# Patient Record
Sex: FEMALE | Race: WHITE | NOT HISPANIC OR LATINO | Employment: FULL TIME | URBAN - NONMETROPOLITAN AREA
[De-identification: names, ages, dates, MRNs, and addresses within clinical notes are randomized per-mention and may not be internally consistent; named-entity substitution may affect disease eponyms.]

---

## 2017-03-29 ENCOUNTER — HOSPITAL ENCOUNTER (OUTPATIENT)
Dept: RADIOLOGY | Facility: OTHER | Age: 47
End: 2017-03-29
Attending: NURSE PRACTITIONER

## 2017-03-29 ENCOUNTER — HISTORY (OUTPATIENT)
Dept: FAMILY MEDICINE | Facility: OTHER | Age: 47
End: 2017-03-29

## 2017-03-29 ENCOUNTER — OFFICE VISIT - GICH (OUTPATIENT)
Dept: FAMILY MEDICINE | Facility: OTHER | Age: 47
End: 2017-03-29

## 2017-03-29 DIAGNOSIS — Z12.39 ENCOUNTER FOR OTHER SCREENING FOR MALIGNANT NEOPLASM OF BREAST: ICD-10-CM

## 2017-03-29 DIAGNOSIS — Z80.0 FAMILY HISTORY OF MALIGNANT NEOPLASM OF DIGESTIVE ORGAN: ICD-10-CM

## 2017-04-24 ENCOUNTER — COMMUNICATION - GICH (OUTPATIENT)
Dept: FAMILY MEDICINE | Facility: OTHER | Age: 47
End: 2017-04-24

## 2017-04-24 DIAGNOSIS — I10 ESSENTIAL (PRIMARY) HYPERTENSION: ICD-10-CM

## 2017-04-25 ENCOUNTER — HOSPITAL ENCOUNTER (OUTPATIENT)
Dept: LAB | Facility: OTHER | Age: 47
End: 2017-04-25
Attending: GENERAL ACUTE CARE HOSPITAL | Admitting: GENERAL ACUTE CARE HOSPITAL

## 2018-01-04 NOTE — TELEPHONE ENCOUNTER
Patient Information     Patient Name MRN Sex Shonda Wharton [id number] Female 1970      Telephone Encounter by Rudi Menjivar RN at 2017  1:47 PM     Author:  Rudi Menjivar RN Service:  (none) Author Type:  NURS- Registered Nurse     Filed:  2017  1:48 PM Encounter Date:  2017 Status:  Signed     :  Rudi Menjivar RN (NURS- Registered Nurse)            Call placed to patient. Advised her of Dr. Patel's response to rx request. Patient states she doesn't currently have insurance, but will go to [facility] to get slip to have labs completed as requested by PCP to support continued use of Prinzide. Nothing further needed at this time.    Rudi Menjivar RN ....................  2017   1:48 PM

## 2018-01-04 NOTE — PROGRESS NOTES
Patient Information     Patient Name MRN Sex Shonda Wharton [id number] Female 1970      Progress Notes by Saundra Perez at 3/29/2017  1:58 PM     Author:  Saundra Perez Service:  (none) Author Type:  (none)     Filed:  3/29/2017  1:58 PM Date of Service:  3/29/2017  1:58 PM Status:  Signed     :  Saundra Perez            Falls Risk Criteria:    Age 65 and older or under age 4        Sensory deficits    Poor vision    Use of ambulatory aides    Impaired judgment    Unable to walk independently    Meets High Risk criteria for falls:  no

## 2018-01-04 NOTE — TELEPHONE ENCOUNTER
Patient Information     Patient Name MRN Sex Shonda Wharton [id number] Female 1970      Telephone Encounter by Rudi Menjivar RN at 2017  9:52 AM     Author:  Rudi Menjivar RN Service:  (none) Author Type:  NURS- Registered Nurse     Filed:  2017  9:58 AM Encounter Date:  2017 Status:  Signed     :  Rudi Menjivar RN (NURS- Registered Nurse)            Diuretic Combinations    Office visit in the past 12 months or per provider note.    Last visit with PAULINE BARLOW was on: No past appointments listed with this provider  Next visit with PAULINE BARLOW is on: No future appointment listed with this provider  Next visit with Family Practice is on: No future appointment listed in this department    Lab test requirements:  Creatinine and Potassium annually, if ordering lab, order BMP.  No results found for: CREATININE  No results found for: POTASSIUM    Max refill for 12 months from last office visit or per provider note.    Chart review shows that last office visit with a provider for an annual physical and medication review was with Dr. Myles on 16. Medication requested also requires annual labs as listed above. Writer unable to refill requested rx as per protocol. Will route request to PCP for his consideration/approval, as well as send reminder letter to patient that she is due for annual labs/annual office visit.    Unable to complete prescription refill per RN Medication Refill Policy.................... Rudi Menjivar RN ....................  2017   9:56 AM

## 2018-01-04 NOTE — PROGRESS NOTES
Patient Information     Patient Name MRN Sex Shonda Wharton [id number] Female 1970      Progress Notes by Imani Regalado NP at 3/29/2017 10:00 AM     Author:  Imani Regalado NP Service:  (none) Author Type:  PHYS- Nurse Practitioner     Filed:  3/30/2017  4:25 PM Encounter Date:  3/29/2017 Status:  Signed     :  Imani Regalado NP (PHYS- Nurse Practitioner)            SUBJECTIVE:    Shonda Schmid is a 47 y.o. female who presents for yearly breast exam, screening mammography [facility]. No family history of breast cancer known.  Does breast exams occasionally, no concerns. Last year mammography negative. Reports had colonoscopy through [facility] and [facility]  last year and found tubular adenomatous polyp, recommended follow-up colonoscopy in 3 years. No notes available to review.    HPI    No Known Allergies,   Family History       Problem   Relation Age of Onset     Heart Disease  Mother 48     CABG       Cancer-colon  Father       of colon cancer at age 58       Heart Disease  Maternal Aunt       who have had bypass surgeries and stents       Heart Disease  Maternal Uncle       who have had bypass surgeries and stents       Heart Disease  Maternal Grandmother      CABG       Other  Paternal Grandmother       at age 93 of natural causes.         Cancer-colon  Paternal Grandmother      Cancer-breast  No Family History    ,   Current Outpatient Prescriptions on File Prior to Visit       Medication  Sig Dispense Refill     lisinopril-hydrochlorothiazide, 20-25 mg, (PRINZIDE, ZESTORETIC) 20-25 mg per tablet Take 1 tablet by mouth once daily. 90 tablet 3     No current facility-administered medications on file prior to visit.    ,   Current Outpatient Prescriptions:      lisinopril-hydrochlorothiazide, 20-25 mg, (PRINZIDE, ZESTORETIC) 20-25 mg per tablet, Take 1 tablet by mouth once daily., Disp: 90 tablet, Rfl: 3  Medications have been reviewed by me and are current to the  "best of my knowledge and ability.,   Past Medical History      Diagnosis   Date     History of pregnancy       Multiparity, Normal vaginal delivery x2      No Significant Past Medical History     ,   Patient Active Problem List      Diagnosis Date Noted     PLANTAR FASCIITIS, RECURRENT      HYPERLIPIDEMIA      Thrombophlebitis of superficial veins of left lower extremity    ,   Past Surgical History       Procedure   Laterality Date     Tonsillectomy   Age 5     Colonoscopy screening   03/2016     TRUDI --colonoscopy program  [facility]--recommend 3 yeaqr followup      and   Social History     Substance Use Topics       Smoking status: Never Smoker     Smokeless tobacco: Not on file     Alcohol use No       REVIEW OF SYSTEMS:  Review of Systems   Constitutional: Negative.    HENT: Negative.    Eyes: Negative.    Respiratory: Negative.    Cardiovascular: Negative.    Gastrointestinal: Negative.    Genitourinary: Negative.    Musculoskeletal: Negative.    Skin: Negative.    Neurological: Negative.    Endo/Heme/Allergies: Negative.    Psychiatric/Behavioral: Negative.        OBJECTIVE:  /82  Ht 1.759 m (5' 9.25\")  Wt 91.2 kg (201 lb)  LMP 03/19/2017  Breastfeeding? No  BMI 29.47 kg/m2    EXAM:   Physical Exam   Constitutional: She is oriented to person, place, and time and well-developed, well-nourished, and in no distress.   Cardiovascular: Normal rate.    Pulmonary/Chest: Effort normal.   Bilateral breast exam negative for any palpable masses, tenderness. No dimpling or erythema. No nipple discharge. No palpable lymphadenopathy   Musculoskeletal: Normal range of motion.   Neurological: She is alert and oriented to person, place, and time. Gait normal.   Skin: Skin is warm and dry.   Psychiatric: Mood, memory, affect and judgment normal.   Nursing note and vitals reviewed.      ASSESSMENT/PLAN:    ICD-10-CM    1. Family history of colon cancer Z80.0 XR MAMMO BILAT SCREENING [facility]   2. Breast cancer screening " Z12.39       mammography pending    Plan:  Patient will follow-up for yearly screening    Patient will obtain [place] colonoscopy screening operative report and pathology and forward for medical records    Last Pap 2015 negative--with negative history--due 2018

## 2018-01-04 NOTE — TELEPHONE ENCOUNTER
Patient Information     Patient Name MRN Sex Shonda Wharton [id number] Female 1970      Telephone Encounter by Higinio Patel MD at 2017 12:02 PM     Author:  Higinio Patel MD Service:  (none) Author Type:  Physician     Filed:  2017 12:04 PM Encounter Date:  2017 Status:  Signed     :  Higinio Patel MD (Physician)            Patient had an exam in March. Blood pressure was normal. Needs a basic metabolic panel. Refill done for a year. Order done. Please have her schedule a lab only appointment.  Higinio Patel MD ....................  2017   12:03 PM

## 2018-01-04 NOTE — PROGRESS NOTES
Patient Information     Patient Name MRN Sex Shonda Wharton [id number] Female 1970      Progress Notes by Ellen Niño RN at 2017  5:16 PM     Author:  Ellen Niño RN Service:  (none) Author Type:  NURS- Registered Nurse     Filed:  2017  5:20 PM Date of Service:  2017  5:16 PM Status:  Signed     :  Ellen Niño RN (NURS- Registered Nurse)            This RN was called by Leela Ryder from the [facility] requesting this patient's chart be reviewed to see if she was receiving care from our hospital clinic. I informed Leela Ryder of the last date seen in this patient's chart as a phone conversation not an actual appointment. No other information released.

## 2018-01-18 PROBLEM — E78.5 HYPERLIPIDEMIA: Status: ACTIVE | Noted: 2018-01-18

## 2018-01-18 PROBLEM — I80.02 THROMBOPHLEBITIS OF SUPERFICIAL VEINS OF LEFT LOWER EXTREMITY: Status: ACTIVE | Noted: 2018-01-18

## 2018-01-18 PROBLEM — M72.2 PLANTAR FASCIAL FIBROMATOSIS: Status: ACTIVE | Noted: 2018-01-18

## 2018-01-18 RX ORDER — LISINOPRIL AND HYDROCHLOROTHIAZIDE 20; 25 MG/1; MG/1
TABLET ORAL
COMMUNITY
Start: 2017-04-25 | End: 2018-06-07

## 2018-01-25 VITALS
WEIGHT: 201 LBS | HEIGHT: 69 IN | BODY MASS INDEX: 29.77 KG/M2 | DIASTOLIC BLOOD PRESSURE: 82 MMHG | SYSTOLIC BLOOD PRESSURE: 124 MMHG

## 2018-04-19 ENCOUNTER — OFFICE VISIT (OUTPATIENT)
Dept: FAMILY MEDICINE | Facility: OTHER | Age: 48
End: 2018-04-19
Attending: FAMILY MEDICINE
Payer: COMMERCIAL

## 2018-04-19 VITALS
HEIGHT: 69 IN | HEART RATE: 68 BPM | BODY MASS INDEX: 29.41 KG/M2 | WEIGHT: 198.6 LBS | SYSTOLIC BLOOD PRESSURE: 132 MMHG | DIASTOLIC BLOOD PRESSURE: 70 MMHG

## 2018-04-19 DIAGNOSIS — Z00.00 ROUTINE GENERAL MEDICAL EXAMINATION AT A HEALTH CARE FACILITY: Primary | ICD-10-CM

## 2018-04-19 PROCEDURE — 99396 PREV VISIT EST AGE 40-64: CPT | Performed by: FAMILY MEDICINE

## 2018-04-19 PROCEDURE — G0123 SCREEN CERV/VAG THIN LAYER: HCPCS | Performed by: FAMILY MEDICINE

## 2018-04-19 PROCEDURE — 88142 CYTOPATH C/V THIN LAYER: CPT | Mod: ZL | Performed by: FAMILY MEDICINE

## 2018-04-19 ASSESSMENT — ANXIETY QUESTIONNAIRES
7. FEELING AFRAID AS IF SOMETHING AWFUL MIGHT HAPPEN: NOT AT ALL
GAD7 TOTAL SCORE: 0
3. WORRYING TOO MUCH ABOUT DIFFERENT THINGS: NOT AT ALL
1. FEELING NERVOUS, ANXIOUS, OR ON EDGE: NOT AT ALL
6. BECOMING EASILY ANNOYED OR IRRITABLE: NOT AT ALL
5. BEING SO RESTLESS THAT IT IS HARD TO SIT STILL: NOT AT ALL
2. NOT BEING ABLE TO STOP OR CONTROL WORRYING: NOT AT ALL

## 2018-04-19 ASSESSMENT — PATIENT HEALTH QUESTIONNAIRE - PHQ9: 5. POOR APPETITE OR OVEREATING: NOT AT ALL

## 2018-04-19 NOTE — LETTER
April 26, 2018      Shonda ISSA Binu  [address]  [place] [address]    Dear ,      I am happy to inform you that your recent cervical cancer screening test (PAP smear) was normal.      Preventative screenings such as this help to ensure your health for years to come. You should repeat a pap smear in 3 years, unless otherwise directed.      You will still need to return to the clinic every year for your annual exam and other preventive tests.     Please contact the clinic at [phone] if you have further questions.       Sincerely,      Franklin Penn MD

## 2018-04-19 NOTE — NURSING NOTE
Patient here for TRUDI pap and mammogram. Needs refill on prescription. Daly Salguero LPN .......................4/19/2018  8:03 AM

## 2018-04-19 NOTE — PROGRESS NOTES
"  SUBJECTIVE:   Shonda Schmid is a 48 year old female who presents to clinic today for the following health issues: Tone physical    HPI Comments: Patient arrives here for a Tone physical.  Currently does not offer any complaints or concerns.    Physical                 Patient Active Problem List    Diagnosis Date Noted     Hyperlipidemia 01/18/2018     Priority: Medium     Plantar fascial fibromatosis 01/18/2018     Priority: Medium     Thrombophlebitis of superficial veins of left lower extremity 01/18/2018     Priority: Medium     Past Medical History:   Diagnosis Date     Personal history of other medical treatment (CODE)     Multiparity, Normal vaginal delivery x2     Personal history of other medical treatment (CODE)     No Comments Provided      Past Surgical History:   Procedure Laterality Date     COLONOSCOPY      03/2016,TONE --colonoscopy program  [facility]--recommend 3 yeaqr followup     TONSILLECTOMY      Age 5     Social History     Social History Narrative     Current Outpatient Prescriptions   Medication Sig Dispense Refill     lisinopril-hydrochlorothiazide (PRINZIDE/ZESTORETIC) 20-25 MG per tablet Take 1 tablet by mouth once daily.       No Known Allergies    Review of Systems     OBJECTIVE:     /70 (BP Location: Right arm, Patient Position: Sitting)  Pulse 68  Ht 5' 8.75\" (1.746 m)  Wt 198 lb 9.6 oz (90.1 kg)  BMI 29.54 kg/m2  Body mass index is 29.54 kg/(m^2).  Physical Exam   Constitutional: She appears well-developed.   HENT:   Head: Normocephalic.   Pulmonary/Chest: Effort normal and breath sounds normal.   Breasts without any masses   Genitourinary: Vagina normal and uterus normal.   Genitourinary Comments: Cervix was unremarkable.  Pap smear obtained   Neurological: She is alert.       none     ASSESSMENT/PLAN:           ICD-10-CM    1. Routine general medical examination at a health care facility Z00.00 Pap Screen Thin Prep with HPV - recommended age 30 - 65 years (select " HPV order below)   Satisfactory exam.  Pap smear submitted.  Mammogram later.      Franklin Penn MD  [facility]

## 2018-04-19 NOTE — MR AVS SNAPSHOT
After Visit Summary   4/19/2018    Shonda Schmid    MRN: [id number]           Patient Information     Date Of Birth          1970        Visit Information        Provider Department      4/19/2018 8:15 AM Franklin Penn MD [facility]        Today's Diagnoses     Routine general medical examination at a health care facility    -  1       Follow-ups after your visit        Your next 10 appointments already scheduled     Apr 20, 2018  1:30 PM CDT   (Arrive by 1:15 PM)   MA SCREENING DIGITAL BILATERAL with GHMA1   [facility]    [address]  [place] [id number] [phone]           Do not use any powder, lotion or deodorant under your arms or on your breast. If you do, we will ask you to remove it before your exam.  Wear comfortable, two-piece clothing.  If you have any allergies, tell your care team.  Bring any previous mammograms from other facilities or have them mailed to the breast center.              Who to contact     If you have questions or need follow up information about today's clinic visit or your schedule please contact [facility] directly at [phone].  Normal or non-critical lab and imaging results will be communicated to you by MyChart, letter or phone within 4 business days after the clinic has received the results. If you do not hear from us within 7 days, please contact the clinic through [employer] or phone. If you have a critical or abnormal lab result, we will notify you by phone as soon as possible.  Submit refill requests through [employer] or call your pharmacy and they will forward the refill request to us. Please allow 3 business days for your refill to be completed.          Additional Information About Your Visit        [employer] Information     [employer] lets you send messages to your doctor, view your test results, renew your prescriptions, schedule  "appointments and more. To sign up, go to [email or web address] . Click on \"Log in\" on the left side of the screen, which will take you to the Welcome page. Then click on \"Sign up Now\" on the right side of the page.     You will be asked to enter the access code listed below, as well as some personal information. Please follow the directions to create your username and password.     Your access code is: [email or web address]  Expires: 2018  2:08 PM     Your access code will  in 90 days. If you need help or a new code, please call your [facility] clinic or [phone].        Care EveryWhere ID     This is your Care EveryWhere ID. This could be used by other organizations to access your [facility] medical records  [id number]        Your Vitals Were     Pulse Height BMI (Body Mass Index)             68 5' 8.75\" (1.746 m) 29.54 kg/m2          Blood Pressure from Last 3 Encounters:   18 132/70   17 124/82   16 118/82    Weight from Last 3 Encounters:   18 198 lb 9.6 oz (90.1 kg)   17 201 lb (91.2 kg)   16 196 lb 12.8 oz (89.3 kg)              We Performed the Following     Pap Screen Thin Prep with HPV - recommended age 30 - 65 years (select HPV order below)        Primary Care Provider Office Phone # Fax #    Higinio Patel [phone] [address] [facility] [address]        Equal Access to Services     [facility]: Hadii aad ku hadashbrooke Soalfie, waaxda luqadaha, qaybta kaalmatisha waite. So [facility] [phone].    ATENCIÓN: Si habla español, tiene a esparza disposición servicios gratuitos de asistencia lingüística. Llame al [phone].    We comply with applicable federal civil rights laws and [place] laws. We do not discriminate on the basis of race, color, national origin, age, disability, sex, sexual orientation, or gender identity.            Thank you!     Thank you for choosing  [facility]  for your care. Our goal is always to provide you with excellent care. Hearing back from our patients is one way we can continue to improve our services. Please take a few minutes to complete the written survey that you may receive in the mail after your visit with us. Thank you!             Your Updated Medication List - Protect others around you: Learn how to safely use, store and throw away your medicines at www.disposemymeds.org.          This list is accurate as of 4/19/18  2:08 PM.  Always use your most recent med list.                   Brand Name Dispense Instructions for use Diagnosis    lisinopril-hydrochlorothiazide 20-25 MG per tablet    PRINZIDE/ZESTORETIC     Take 1 tablet by mouth once daily.

## 2018-04-20 ENCOUNTER — HOSPITAL ENCOUNTER (OUTPATIENT)
Dept: MAMMOGRAPHY | Facility: OTHER | Age: 48
Discharge: HOME | End: 2018-04-20
Attending: FAMILY MEDICINE | Admitting: FAMILY MEDICINE
Payer: COMMERCIAL

## 2018-04-20 DIAGNOSIS — Z12.31 VISIT FOR SCREENING MAMMOGRAM: ICD-10-CM

## 2018-04-20 PROCEDURE — 77067 SCR MAMMO BI INCL CAD: CPT

## 2018-04-20 ASSESSMENT — ANXIETY QUESTIONNAIRES: GAD7 TOTAL SCORE: 0

## 2018-04-20 ASSESSMENT — PATIENT HEALTH QUESTIONNAIRE - PHQ9: SUM OF ALL RESPONSES TO PHQ QUESTIONS 1-9: 0

## 2018-06-07 DIAGNOSIS — I10 BENIGN ESSENTIAL HYPERTENSION: Primary | ICD-10-CM

## 2018-06-07 NOTE — LETTER
June 11, 2018      Shonda Schmid  [address]  [place] [address]        Dear Shonda,     This letter is to remind you that you are due for follow up labs.    A LIMITED refill of lisinopril/hctz has been called into your pharmacy. Additional refills require you to complete this appointment.    Please call the clinic at [phone] to schedule your appointment.    If you should require additional refills before your scheduled appointment, please contact your pharmacy and we will refill your medication until the date of your appointment.      Thank you for choosing [facility] for your health care needs.    Sincerely,    Refill RN  [facility]

## 2018-06-11 RX ORDER — LISINOPRIL AND HYDROCHLOROTHIAZIDE 20; 25 MG/1; MG/1
TABLET ORAL
Qty: 90 TABLET | Refills: 0 | Status: SHIPPED | OUTPATIENT
Start: 2018-06-11 | End: 2019-08-05

## 2018-06-11 NOTE — TELEPHONE ENCOUNTER
Medication is being filled for 1 time refill only due to: sodium, creatinine, potassium.    Patient is due for medication management appointment. Limited refill provided at this time. Krush message and/or letter sent for reminder to patient. Prescription refilled per RN Medication Refill Policy.................... Clara Garcia ....................  6/11/2018   10:19 AM

## 2018-07-23 NOTE — PROGRESS NOTES
Patient Information     Patient Name  Shonda Schmid MRN  [id number] Sex  Female   1970      Letter by Higinio Patel MD at      Author:  Higinio Patel MD Service:  (none) Author Type:  (none)    Filed:   Encounter Date:  2017 Status:  (Other)           Shonda Schmid  [address]  [facility] [address]          2017    Dear Ms. Schmid:    This is to remind you that you are due for your 1 year physical appointment with Higinio Patel MD as well as annual labs.  Your last visit for an annual physical was on 16. Additional refills of your medication require you to complete this visit.    Please call [phone] to schedule your appointment.    Thank you for choosing [facility] for your health care needs.    Sincerely,      Refill RN  [facility]

## 2018-10-09 DIAGNOSIS — I10 BENIGN ESSENTIAL HYPERTENSION: ICD-10-CM

## 2018-10-09 LAB
ALBUMIN SERPL-MCNC: 4.2 G/DL (ref 3.5–5.7)
ALP SERPL-CCNC: 67 U/L (ref 34–104)
ALT SERPL W P-5'-P-CCNC: 12 U/L (ref 7–52)
ANION GAP SERPL CALCULATED.3IONS-SCNC: 8 MMOL/L (ref 3–14)
AST SERPL W P-5'-P-CCNC: 14 U/L (ref 13–39)
BILIRUB SERPL-MCNC: 0.6 MG/DL (ref 0.3–1)
BUN SERPL-MCNC: 18 MG/DL (ref 7–25)
CALCIUM SERPL-MCNC: 9.4 MG/DL (ref 8.6–10.3)
CHLORIDE SERPL-SCNC: 102 MMOL/L (ref 98–107)
CHOLEST SERPL-MCNC: 207 MG/DL
CO2 SERPL-SCNC: 26 MMOL/L (ref 21–31)
CREAT SERPL-MCNC: 0.81 MG/DL (ref 0.6–1.2)
GFR SERPL CREATININE-BSD FRML MDRD: 75 ML/MIN/1.7M2
GLUCOSE SERPL-MCNC: 125 MG/DL (ref 70–105)
HBA1C MFR BLD: 5.9 % (ref 4–6)
HDLC SERPL-MCNC: 31 MG/DL (ref 23–92)
LDLC SERPL CALC-MCNC: 142 MG/DL
NONHDLC SERPL-MCNC: 176 MG/DL
POTASSIUM SERPL-SCNC: 4.2 MMOL/L (ref 3.5–5.1)
PROT SERPL-MCNC: 7.1 G/DL (ref 6.4–8.9)
SODIUM SERPL-SCNC: 136 MMOL/L (ref 134–144)
TRIGL SERPL-MCNC: 171 MG/DL

## 2018-10-09 PROCEDURE — 80053 COMPREHEN METABOLIC PANEL: CPT | Performed by: FAMILY MEDICINE

## 2018-10-09 PROCEDURE — 83036 HEMOGLOBIN GLYCOSYLATED A1C: CPT | Performed by: FAMILY MEDICINE

## 2018-10-09 PROCEDURE — 36415 COLL VENOUS BLD VENIPUNCTURE: CPT | Performed by: FAMILY MEDICINE

## 2018-10-09 PROCEDURE — 80061 LIPID PANEL: CPT | Performed by: FAMILY MEDICINE

## 2019-08-05 ENCOUNTER — OFFICE VISIT (OUTPATIENT)
Dept: FAMILY MEDICINE | Facility: OTHER | Age: 49
End: 2019-08-05
Attending: PHYSICIAN ASSISTANT

## 2019-08-05 ENCOUNTER — HOSPITAL ENCOUNTER (OUTPATIENT)
Dept: MAMMOGRAPHY | Facility: OTHER | Age: 49
Discharge: HOME | End: 2019-08-05
Attending: PHYSICIAN ASSISTANT | Admitting: PHYSICIAN ASSISTANT

## 2019-08-05 VITALS
SYSTOLIC BLOOD PRESSURE: 132 MMHG | RESPIRATION RATE: 18 BRPM | HEIGHT: 68 IN | BODY MASS INDEX: 30.62 KG/M2 | WEIGHT: 202 LBS | DIASTOLIC BLOOD PRESSURE: 82 MMHG | TEMPERATURE: 96.9 F | HEART RATE: 72 BPM

## 2019-08-05 DIAGNOSIS — R73.03 PREDIABETES: ICD-10-CM

## 2019-08-05 DIAGNOSIS — Z12.31 VISIT FOR SCREENING MAMMOGRAM: ICD-10-CM

## 2019-08-05 DIAGNOSIS — I10 BENIGN ESSENTIAL HYPERTENSION: ICD-10-CM

## 2019-08-05 DIAGNOSIS — Z00.00 ROUTINE HISTORY AND PHYSICAL EXAMINATION OF ADULT: Primary | ICD-10-CM

## 2019-08-05 LAB
ANION GAP SERPL CALCULATED.3IONS-SCNC: 7 MMOL/L (ref 3–14)
BUN SERPL-MCNC: 15 MG/DL (ref 7–25)
CALCIUM SERPL-MCNC: 9.4 MG/DL (ref 8.6–10.3)
CHLORIDE SERPL-SCNC: 99 MMOL/L (ref 98–107)
CO2 SERPL-SCNC: 30 MMOL/L (ref 21–31)
CREAT SERPL-MCNC: 0.75 MG/DL (ref 0.6–1.2)
GFR SERPL CREATININE-BSD FRML MDRD: 82 ML/MIN/{1.73_M2}
GLUCOSE SERPL-MCNC: 100 MG/DL (ref 70–105)
HBA1C MFR BLD: 6.3 % (ref 4–6)
POTASSIUM SERPL-SCNC: 3.9 MMOL/L (ref 3.5–5.1)
SODIUM SERPL-SCNC: 136 MMOL/L (ref 134–144)

## 2019-08-05 PROCEDURE — 36415 COLL VENOUS BLD VENIPUNCTURE: CPT | Mod: ZL | Performed by: PHYSICIAN ASSISTANT

## 2019-08-05 PROCEDURE — 77063 BREAST TOMOSYNTHESIS BI: CPT

## 2019-08-05 PROCEDURE — 99396 PREV VISIT EST AGE 40-64: CPT | Performed by: PHYSICIAN ASSISTANT

## 2019-08-05 PROCEDURE — 80048 BASIC METABOLIC PNL TOTAL CA: CPT | Mod: ZL | Performed by: PHYSICIAN ASSISTANT

## 2019-08-05 PROCEDURE — 83036 HEMOGLOBIN GLYCOSYLATED A1C: CPT | Mod: ZL | Performed by: PHYSICIAN ASSISTANT

## 2019-08-05 RX ORDER — LISINOPRIL AND HYDROCHLOROTHIAZIDE 20; 25 MG/1; MG/1
1 TABLET ORAL DAILY
Qty: 90 TABLET | Refills: 3 | Status: SHIPPED | OUTPATIENT
Start: 2019-08-05 | End: 2020-08-03

## 2019-08-05 ASSESSMENT — MIFFLIN-ST. JEOR: SCORE: 1584.77

## 2019-08-05 NOTE — LETTER
August 6, 2019      Shonda Schmid  [address] [place] [address]        Dear ,    We are writing to inform you of your test results.    Your electrolytes and kidney function are in the normal range.    Your hemoglobin A1c which is a diabetes check increased from 5.9 to 6.3.  This puts you on the higher end of the prediabetic range.  I would recommend working on good diet, exercise, weight loss and decreasing sugar in your diet in order to reduce future risk of diabetes concerns.  I also placed a referral for a prediabetic educator to discuss ways to reduce future risk of diabetes.    Consider the following general recommendations to keep your blood sugar in a good range:    1. Lose weight - Losing 5 to 10 percent of your body weight can lower your risk a lot. If you weigh 200 pounds, that means you should lose 10 to 20 pounds. If you weigh 150 pounds, that means you should lose 7 to 15 pounds.     2. Eat right - Choose a diet rich in fruits, vegetables, and low-fat dairy products, but low in meats, sweets, and refined grains. Stay away from sweet drinks, like soda and juice.     3. Be active for 30 minutes a day - You don t have to go to the gym or break a sweat to get a benefit. Walking, gardening, and dancing are all activities that can help.     4. Quit smoking - If you smoke, ask your doctor or nurse for advice on how to quit. People are much more likely to succeed if they have help and get medicines to help them quit.         Resulted Orders   Hemoglobin A1c   Result Value Ref Range    Hemoglobin A1C 6.3 (H) 4.0 - 6.0 %   Basic Metabolic Panel   Result Value Ref Range    Sodium 136 134 - 144 mmol/L    Potassium 3.9 3.5 - 5.1 mmol/L    Chloride 99 98 - 107 mmol/L    Carbon Dioxide 30 21 - 31 mmol/L    Anion Gap 7 3 - 14 mmol/L    Glucose 100 70 - 105 mg/dL    Urea Nitrogen 15 7 - 25 mg/dL    Creatinine 0.75 0.60 - 1.20 mg/dL    GFR Estimate 82 >60 mL/min/[1.73_m2]    GFR Estimate  If Black >90 >60 mL/min/[1.73_m2]    Calcium 9.4 8.6 - 10.3 mg/dL       If you have any questions or concerns, please call the clinic at the number listed above.       Sincerely,        Diana Hernandez PA-C

## 2019-08-05 NOTE — PATIENT INSTRUCTIONS
"Please call to schedule a recheck colonoscopy.     Healthy Strategies  1. Eat at least 3 meals a day and never skip breakfast.  2. Eat more slowly.  3. Decrease portion size.  4. Provide structure by using meal replacement bars or shakes, and/or low calorie frozen meals.  5. For good nutrition incorporate fruit, vegetables, whole grains, lean protein, and low-fat dairy.  6. Remove trigger foods from yourenvironment to avoid impulse eating.  7. Increase physical activity: get a pedometer and aim for 10,000 steps a day or 30-35 minutes of activity 5 days per week.  8. Weigh yourself daily or at least weekly.  9. Keep a record of what you eat and your activity.  10. Establish a support system such as afriend, group or program.    11. Read Edenilson Corral's \"Eat to Live\". Remember it is important to have a minimum of 1200 calories a day, okay to use olive oil, 40 grams of fiber daily. No more than two servings (the size of your palm) of red meat a week.     Please consider the following general health recommendations:    Schedule a mammogram annually starting at the age of 40 years old unless recommended earlier by your primary care provider. Come for a general exam once yearly.    Eat a quality diet (generally, low in simple sugars, starches, cholesterol and saturated fat.)    Please get 1500 mg of calcium in divided doses with 1500 units vitamin D in your diet daily. Take supplements as needed to obtain full recommended amounts.     Stay physically active. Regular walking or other exercise is one of the best ways to minimize pain of arthritis; maintain independence and mobility; maintain bone strength; maintain conditioning of your heart. Find something you enjoy and a friend to do it with you.    Maintain ideal weight. Your Body mass index is Body mass index is 30.71 kg/m .. Generally a BMI of 20-25 is considered ideal. Overweight is defined as 25-30, obese is 30-35 and markedly obese is greater than 35.    Apply sun " block (SPF 25 or greater) on exposed skin anytime you are out in the sun to prevent skin cancer.     Wear a seatbelt whenever you are in a car.    Colonoscopy (an exam of the colon) is recommended every 10 years after the age of 50 to screen for colon cancer. (More often if you are at increased risk.)    Obtain a flu shot every fall.    You should have a tetanus booster at least once every 10 years.    A vaccine to reduce your chances of getting shingles is available if you are over 50. Information about the vaccine is available through the clinic or at:  (https://www.cdc.gov/vaccines/vpd/shingles/public/shingrix/index.html)    Check blood sugar annually. Cholesterol annually unless you have had a normal level when last checked within 5 years.     I recommend that you have a general physical exam every year. You should have a pap test every 3 years between the ages of 21 and 30 and every 3-5 years between the ages of 30 and 65 depending on your test unless you have had previous abnormal pap smears, (in these cases the exams and PAP's should be done on a schedule as recommended by your primary care provider). If you have had hysterectomy in the past, your future Pap plan may be different.

## 2019-08-05 NOTE — NURSING NOTE
Chief Complaint   Patient presents with     Physical     TRUDI         Medication Reconciliation: complete    Wilda Barrera, LPN

## 2019-08-05 NOTE — PROGRESS NOTES
Nursing Notes:   Wilda Barrera LPN  2019  9:17 AM  Signed  Chief Complaint   Patient presents with     Physical     TRUDI         Medication Reconciliation: complete    Wilda Barrera LPN      ANNUAL PHYSICAL - FEMALE    HPI: Shonda Schmid who presents for a yearly exam.  Concerns include: TRUDI physical.     Patient has history of hypertension.  Currently taking lisinopril/hydrochlorothiazide.  No problems or side effects of the medication.  No chest pain, palpitations, problems breathing, arm pain, jaw pain.  No recent cough or cold symptoms.  No GI or urinary symptoms.  Tolerating the medication well.  Blood pressure has been stable.    Patient's last menstrual period was 2019 (approximate).   Contraception: no  Risk for STI?: no  Last pap: UTD  Any hx of abnormal paps:  no  FH of early CA?: no  Cholesterol/DM concerns/screening: Prediabetes in the past, needs rechecked.  Up-to-date on cholesterol screening  Tobacco?: no  Calcium intake: yes  DEXA: na  Last mammo: need one  Colonoscopy: need one  Immunizations: Offered Tdap and Shingrix.  Declined today.    Patient Active Problem List    Diagnosis Date Noted     Hyperlipidemia 2018     Priority: Medium     Plantar fascial fibromatosis 2018     Priority: Medium     Thrombophlebitis of superficial veins of left lower extremity 2018     Priority: Medium       Past Medical History:   Diagnosis Date     Personal history of other medical treatment (CODE)     Multiparity, Normal vaginal delivery x2     Personal history of other medical treatment (CODE)     No Comments Provided       Past Surgical History:   Procedure Laterality Date     COLONOSCOPY      2016,TRUDI --colonoscopy program  [facility]--recommend 3 yeaqr followup     TONSILLECTOMY      Age 5       Family History   Problem Relation Age of Onset     Heart Disease Mother 48        Heart Disease,CABG     Colon Cancer Father         Cancer-colon, of  "colon cancer at age 58     Heart Disease Maternal Aunt         Heart Disease, who have had bypass surgeries and stents     Heart Disease Maternal Uncle         Heart Disease, who have had bypass surgeries and stents     Heart Disease Maternal Grandmother         Heart Disease,CABG     Other - See Comments Paternal Grandmother          at age 93 of natural causes.     Colon Cancer Paternal Grandmother         Cancer-colon     Breast Cancer No family hx of         Cancer-breast       Social History     Tobacco Use     Smoking status: Never Smoker     Smokeless tobacco: Never Used   Substance Use Topics     Alcohol use: No       Current Outpatient Medications   Medication Sig Dispense Refill     lisinopril-hydrochlorothiazide (PRINZIDE/ZESTORETIC) 20-25 MG tablet Take 1 tablet by mouth daily 90 tablet 3       No Known Allergies    REVIEW OF SYSTEMS:  Refer to HPI.    PHYSICAL EXAM:  /82 (BP Location: Right arm, Patient Position: Sitting, Cuff Size: Adult Regular)   Pulse 72   Temp 96.9  F (36.1  C)   Resp 18   Ht 1.727 m (5' 8\")   Wt 91.6 kg (202 lb)   LMP 2019 (Approximate)   BMI 30.71 kg/m    CONSTITUTIONAL:  Alert,cooperative, NAD.  EYES: No scleral icterus.  PERRLA.  Conjunctiva clear.  ENT/MOUTH: External ears and nose normal.  TMs normal.  Moist mucous membranes. Oropharynx clear.    ENDO: No thyromegaly or thyroidnodules.  LYMPH:  No cervical or supraclavicular LA.    BREASTS: No skin abnormalities, no erythema.  No discrete masses.  No nipple discharge, no axillary, supra- or infraclavicular LA.   CARDIOVASCULAR: Regular,S1, S2.  No S3 or S4.  No murmur/gallop/rub.  No peripheral edema.  RESPIRATORY: CTA bilaterally, no wheezes, rhonchi or rales.  GI: Bowel sounds wnl.  Soft, nontender, nondistended.  No masses or HSM.  No rebound or guarding.  : declined exam.  Pap smear obtained: no  MSKEL: Grossly normal ROM.  No clubbing.  INTEGUMENTARY:  Warm, dry.  No rash noted on exposed " "skin.  NEUROLOGIC: Facies symmetric.  Grossly normal movement and tone.  No tremor.  PSYCHIATRIC: Affect normal.  Speech fluent.      PHQ Depression Screen  PHQ-9 SCORE 4/19/2018   PHQ-9 Total Score 0     ASSESSMENT AND PLAN:      ICD-10-CM    1. Routine history and physical examination of adult Z00.00    2. Benign essential hypertension I10 lisinopril-hydrochlorothiazide (PRINZIDE/ZESTORETIC) 20-25 MG tablet     Basic Metabolic Panel     Basic Metabolic Panel   3. Prediabetes R73.03 Hemoglobin A1c     Hemoglobin A1c       Encouraged the patient to call the TRUDI program in order to schedule her colonoscopy.    Hypertension:  Refilled lisinopril/hydrochlorthiazide.  No changes are warranted at this time.  Encouraged continued good diet and exercise.  Completed BMP for monitoring.    Prediabetes: Complete hemoglobin A1c and glucose for monitoring.  Results are pending.    Return in 1 year for repeat physical.    Patient Instructions   Please call to schedule a recheck colonoscopy.     Healthy Strategies  1. Eat at least 3 meals a day and never skip breakfast.  2. Eat more slowly.  3. Decrease portion size.  4. Provide structure by using meal replacement bars or shakes, and/or low calorie frozen meals.  5. For good nutrition incorporate fruit, vegetables, whole grains, lean protein, and low-fat dairy.  6. Remove trigger foods from yourenvironment to avoid impulse eating.  7. Increase physical activity: get a pedometer and aim for 10,000 steps a day or 30-35 minutes of activity 5 days per week.  8. Weigh yourself daily or at least weekly.  9. Keep a record of what you eat and your activity.  10. Establish a support system such as afriend, group or program.    11. Read Edenilson Corral's \"Eat to Live\". Remember it is important to have a minimum of 1200 calories a day, okay to use olive oil, 40 grams of fiber daily. No more than two servings (the size of your palm) of red meat a week.     Please consider the following general " health recommendations:    Schedule a mammogram annually starting at the age of 40 years old unless recommended earlier by your primary care provider. Come for a general exam once yearly.    Eat a quality diet (generally, low in simple sugars, starches, cholesterol and saturated fat.)    Please get 1500 mg of calcium in divided doses with 1500 units vitamin D in your diet daily. Take supplements as needed to obtain full recommended amounts.     Stay physically active. Regular walking or other exercise is one of the best ways to minimize pain of arthritis; maintain independence and mobility; maintain bone strength; maintain conditioning of your heart. Find something you enjoy and a friend to do it with you.    Maintain ideal weight. Your Body mass index is Body mass index is 30.71 kg/m .. Generally a BMI of 20-25 is considered ideal. Overweight is defined as 25-30, obese is 30-35 and markedly obese is greater than 35.    Apply sun block (SPF 25 or greater) on exposed skin anytime you are out in the sun to prevent skin cancer.     Wear a seatbelt whenever you are in a car.    Colonoscopy (an exam of the colon) is recommended every 10 years after the age of 50 to screen for colon cancer. (More often if you are at increased risk.)    Obtain a flu shot every fall.    You should have a tetanus booster at least once every 10 years.    A vaccine to reduce your chances of getting shingles is available if you are over 50. Information about the vaccine is available through the clinic or at:  (https://www.cdc.gov/vaccines/vpd/shingles/public/shingrix/index.html)    Check blood sugar annually. Cholesterol annually unless you have had a normal level when last checked within 5 years.     I recommend that you have a general physical exam every year. You should have a pap test every 3 years between the ages of 21 and 30 and every 3-5 years between the ages of 30 and 65 depending on your test unless you have had previous abnormal pap  smears, (in these cases the exams and PAP's should be done on a schedule as recommended by your primary care provider). If you have had hysterectomy in the past, your future Pap plan may be different.           Relevant cancer screening discussed.    Counseled on healthy diet, Calcium and vitamin D intake, and exercise.    Diana Hernandez PA-C..................8/5/2019 9:15 AM

## 2019-08-06 ENCOUNTER — TELEPHONE (OUTPATIENT)
Dept: FAMILY MEDICINE | Facility: OTHER | Age: 49
End: 2019-08-06

## 2019-08-06 DIAGNOSIS — R73.03 PREDIABETES: Primary | ICD-10-CM

## 2019-08-06 NOTE — TELEPHONE ENCOUNTER
Patient notified, is not interested in meeting with prediabetic educator at this time.  Mickie Barrera LPN ...... 8/6/2019 9:45 AM

## 2019-08-06 NOTE — TELEPHONE ENCOUNTER
Your electrolytes and kidney function are in the normal range.  Your hemoglobin A1c which is a diabetes check increased from 5.9-6.3.  This puts you on the higher end of the prediabetic range.  I would recommend working on good diet, exercise, weight loss and decreasing sugar in your diet in order to reduce future risk of diabetes concerns.  I also placed a referral for a prediabetic educator to discuss ways to reduce future risk of diabetes.  Diana Hernandez PA-C.......... 8/6/2019 9:00 AM

## 2019-08-07 ENCOUNTER — ALLIED HEALTH/NURSE VISIT (OUTPATIENT)
Dept: FAMILY MEDICINE | Facility: OTHER | Age: 49
End: 2019-08-07

## 2019-08-07 DIAGNOSIS — Z11.1 SCREENING EXAMINATION FOR PULMONARY TUBERCULOSIS: Primary | ICD-10-CM

## 2019-08-07 PROCEDURE — 86580 TB INTRADERMAL TEST: CPT

## 2019-08-07 NOTE — PROGRESS NOTES
The patient is asked the following questions today and these are her answers:  Verified name and date of birth   -Have you had a mantoux administered in the past 30 days?    No  -Have you had a previous positive Mantoux.  No  -Have you received BCG in the past.  No  -Have you had a live vaccine  (MMR, Varicella, OPV, Yellow Fever) in the last 6 weeks.  No  -Have you had and active  viral or bacterial infection in the past 6 weeks.  No  -Have you received corticosteroids or immunosuppressive agents in the past 6 weeks.  No  -Have you been diagnosed with HIV?  No  -Do you have a malignancy?  No  Pt will return here to have read in 48 to 72 hours   Lashonda Quinn RN on 8/7/2019 at 2:19 PM

## 2019-08-09 ENCOUNTER — ALLIED HEALTH/NURSE VISIT (OUTPATIENT)
Dept: FAMILY MEDICINE | Facility: OTHER | Age: 49
End: 2019-08-09

## 2019-08-09 DIAGNOSIS — Z30.42 ENCOUNTER FOR MANAGEMENT AND INJECTION OF DEPO-PROVERA: Primary | ICD-10-CM

## 2019-08-09 LAB
PPDINDURATION: 0 MM (ref 0–5)
PPDREDNESS: 0 MM

## 2019-08-09 NOTE — PROGRESS NOTES
Pt was here to have Mantoux read and copy of paper form of Mantoux read and negative . Verified name and date of birth . Lashonda Quinn RN on 8/9/2019 at 2:50 PM

## 2020-07-31 DIAGNOSIS — I10 BENIGN ESSENTIAL HYPERTENSION: ICD-10-CM

## 2020-07-31 NOTE — LETTER
August 3, 2020      Shonda Schmid  [address]  [place] [address]        Dear Shonda,     A refill request was received from your pharmacy for Lisinopril-hydrochlorothiazide.    Additional refills require an office visit with Diana Hernandez for annual review and labs.    Please call [phone] to schedule appointment.          Sincerely,      Refill Nurse

## 2020-08-03 RX ORDER — LISINOPRIL AND HYDROCHLOROTHIAZIDE 20; 25 MG/1; MG/1
TABLET ORAL
Qty: 90 TABLET | Refills: 0 | Status: SHIPPED | OUTPATIENT
Start: 2020-08-03 | End: 2020-10-09

## 2020-08-03 NOTE — TELEPHONE ENCOUNTER
Prescription approved per [facility] Refill Protocol.  LVO and labs: 8/5/2019  Patient is due for annual review and labs  Letter sent  Yenny refills given    Steph Kerr RN on 8/3/2020 at 3:58 PM     Rhomboid Transposition Flap Text: The defect edges were debeveled with a #15 scalpel blade.  Given the location of the defect and the proximity to free margins a rhomboid transposition flap was deemed most appropriate.  Using a sterile surgical marker, an appropriate rhomboid flap was drawn incorporating the defect.    The area thus outlined was incised deep to adipose tissue with a #15 scalpel blade.  The skin margins were undermined to an appropriate distance in all directions utilizing iris scissors.

## 2020-10-09 ENCOUNTER — OFFICE VISIT (OUTPATIENT)
Dept: FAMILY MEDICINE | Facility: OTHER | Age: 50
End: 2020-10-09
Attending: PHYSICIAN ASSISTANT
Payer: COMMERCIAL

## 2020-10-09 VITALS
SYSTOLIC BLOOD PRESSURE: 118 MMHG | HEART RATE: 85 BPM | WEIGHT: 206.2 LBS | TEMPERATURE: 97 F | DIASTOLIC BLOOD PRESSURE: 78 MMHG | BODY MASS INDEX: 31.35 KG/M2 | RESPIRATION RATE: 16 BRPM | OXYGEN SATURATION: 99 %

## 2020-10-09 DIAGNOSIS — Z00.00 ROUTINE HISTORY AND PHYSICAL EXAMINATION OF ADULT: Primary | ICD-10-CM

## 2020-10-09 DIAGNOSIS — I10 BENIGN ESSENTIAL HYPERTENSION: ICD-10-CM

## 2020-10-09 PROCEDURE — 99396 PREV VISIT EST AGE 40-64: CPT | Performed by: PHYSICIAN ASSISTANT

## 2020-10-09 RX ORDER — LISINOPRIL AND HYDROCHLOROTHIAZIDE 20; 25 MG/1; MG/1
1 TABLET ORAL DAILY
Qty: 90 TABLET | Refills: 0 | Status: SHIPPED | OUTPATIENT
Start: 2020-10-09 | End: 2021-01-27

## 2020-10-09 ASSESSMENT — PAIN SCALES - GENERAL: PAINLEVEL: NO PAIN (0)

## 2020-10-09 NOTE — PROGRESS NOTES
Nursing Notes:   Solange Gray LPN  10/9/2020 11:06 AM  Signed  Chief Complaint   Patient presents with     Breast Pain     Patient here for breast exam. Patient has no complaints.  Medication Reconciliation complete.   Solange Gray LPN  ..................10/9/2020   10:55 AM       ANNUAL PHYSICAL - FEMALE    HPI: Shonda Schmid who presents for a yearly exam.  Concerns include: None.  Patient needs a refill of her blood pressure medication.  Needs to be seen in the [facility] in order to be able to get her blood work completed.  Blood pressure has been stable.    Patient has paperwork for the TRUDI physical.  Needs to have a clinical breast exam.  No acute breast concerns.    Patient's last menstrual period was 06/09/2020.   Contraception: no  Risk for STI?: no  Last pap: 4/19/2018 - nl pap and HPV, repeat in 5 years  Any hx of abnormal paps:  no  FH of early CA?: no  Cholesterol/DM concerns/screening: Prediabetes in the past, needs rechecked.  Needs cholesterol screening  Tobacco?: no  Calcium intake: yes  DEXA: na  Last mammo: 8/5/2019, needs to be repeated  Colonoscopy: need one  Immunizations: Offered Tdap and Shingrix.  Declined today.    Patient Active Problem List    Diagnosis Date Noted     Prediabetes 08/05/2019     Priority: Medium     Hyperlipidemia 01/18/2018     Priority: Medium     Plantar fascial fibromatosis 01/18/2018     Priority: Medium     Thrombophlebitis of superficial veins of left lower extremity 01/18/2018     Priority: Medium       Past Medical History:   Diagnosis Date     Personal history of other medical treatment (CODE)     Multiparity, Normal vaginal delivery x2     Personal history of other medical treatment (CODE)     No Comments Provided       Past Surgical History:   Procedure Laterality Date     COLONOSCOPY      03/2016,TRUDI --colonoscopy program  [facility]--recommend 3 yeaqr followup     TONSILLECTOMY      Age 5       Family History   Problem Relation Age  of Onset     Heart Disease Mother 48        Heart Disease,CABG     Colon Cancer Father         Cancer-colon, of colon cancer at age 58     Heart Disease Maternal Aunt         Heart Disease, who have had bypass surgeries and stents     Heart Disease Maternal Uncle         Heart Disease, who have had bypass surgeries and stents     Heart Disease Maternal Grandmother         Heart Disease,CABG     Other - See Comments Paternal Grandmother          at age 93 of natural causes.     Colon Cancer Paternal Grandmother         Cancer-colon     Breast Cancer No family hx of         Cancer-breast       Social History     Tobacco Use     Smoking status: Never Smoker     Smokeless tobacco: Never Used   Substance Use Topics     Alcohol use: No       Current Outpatient Medications   Medication Sig Dispense Refill     lisinopril-hydrochlorothiazide (ZESTORETIC) 20-25 MG tablet Take 1 tablet by mouth daily 90 tablet 0       No Known Allergies    REVIEW OF SYSTEMS:  Refer to HPI.    PHYSICAL EXAM:  /78 (BP Location: Right arm, Patient Position: Sitting, Cuff Size: Adult Regular)   Pulse 85   Temp 97  F (36.1  C) (Tympanic)   Resp 16   Wt 93.5 kg (206 lb 3.2 oz)   LMP 2020   SpO2 99%   Breastfeeding No   BMI 31.35 kg/m    CONSTITUTIONAL:  Alert,cooperative, NAD.  EYES: No scleral icterus.  PERRLA.  Conjunctiva clear.  ENT/MOUTH: External ears and nose normal.  TMs normal.  Moist mucous membranes. Oropharynx clear.    ENDO: No thyromegaly or thyroidnodules.  LYMPH:  No cervical or supraclavicular LA.    BREASTS: No skin abnormalities, no erythema.  No discrete masses.  No nipple discharge, no axillary, supra- or infraclavicular LA.   CARDIOVASCULAR: Regular,S1, S2.  No S3 or S4.  No murmur/gallop/rub.  No peripheral edema.  RESPIRATORY: CTA bilaterally, no wheezes, rhonchi or rales.  GI: Bowel sounds wnl.  Soft, nontender, nondistended.  No masses or HSM.  No rebound or guarding.  : declined exam.  Pap  "smear obtained: no  MSKEL: Grossly normal ROM.  No clubbing.  INTEGUMENTARY:  Warm, dry.  No rash noted on exposed skin.  NEUROLOGIC: Facies symmetric.  Grossly normal movement and tone.  No tremor.  PSYCHIATRIC: Affect normal.  Speech fluent.      PHQ Depression Screen  PHQ-9 SCORE 4/19/2018   PHQ-9 Total Score 0       Labs:  No results found for any visits on 10/09/20.    ASSESSMENT AND PLAN:      ICD-10-CM    1. Routine history and physical examination of adult  Z00.00    2. Benign essential hypertension  I10 lisinopril-hydrochlorothiazide (ZESTORETIC) 20-25 MG tablet         Completed TRUDI paperwork.  Patient has appointment for a mammogram for breast cancer screening.    Hypertension: Refill blood pressure medication for 3 months.  Declined blood work today.  Will be seen at the [facility] for recheck along with blood work.    Offered Tdap and Shingrix however patient declined at this time.    Relevant cancer screening discussed.    Counseled on healthy diet, Calcium and vitamin D intake, and exercise.    Patient Instructions   Healthy Strategies  1. Eat at least 3 meals a day and never skip breakfast.  2. Eat more slowly.  3. Decrease portion size.  4. Provide structure by using meal replacement bars or shakes, and/or low calorie frozen meals.  5. For good nutrition incorporate fruit, vegetables, whole grains, lean protein, and low-fat dairy.  6. Remove trigger foods from yourenvironment to avoid impulse eating.  7. Increase physical activity: get a pedometer and aim for 10,000 steps a day or 30-35 minutes of activity 5 days per week.  8. Weigh yourself daily or at least weekly.  9. Keep a record of what you eat and your activity.  10. Establish a support system such as afriend, group or program.    11. Read Edenilson Corral's \"Eat to Live\". Remember it is important to have a minimum of 1200 calories a day, okay to use olive oil, 40 grams of fiber daily. No more than two servings (the size of your palm) of red " meat a week.     Please consider the following general health recommendations:    Schedule a mammogram annually starting at the age of 40 years old unless recommended earlier by your primary care provider. Come for a general exam once yearly.    Eat a quality diet (generally, low in simple sugars, starches, cholesterol and saturated fat.)    Please get 1200 mg of calcium in divided doses with 800 units vitamin D in your diet daily. Take supplements as needed to obtain full recommended amounts.     Stay physically active. Regular walking or other exercise is one of the best ways to minimize pain of arthritis; maintain independence and mobility; maintain bone strength; maintain conditioning of your heart. Find something you enjoy and a friend to do it with you.    Maintain ideal weight. Your Body mass index is Body mass index is 31.35 kg/m .. Generally a BMI of 20-25 is considered ideal. Overweight is defined as 25-30, obese is 30-35 and markedly obese is greater than 35.    Apply sun block (SPF 25 or greater) on exposed skin anytime you are out in the sun to prevent skin cancer.     Wear a seatbelt whenever you are in a car.    Colonoscopy (an exam of the colon) is recommended every 10 years after the age of 50 to screen for colon cancer. (More often if you are at increased risk.)    Obtain a flu shot every fall.    You should have a tetanus booster at least once every 10 years.    A vaccine to reduce your chances of getting shingles is available if you are over 50. Information about the vaccine is available through the clinic or at:  (https://www.cdc.gov/vaccines/vpd/shingles/public/shingrix/index.html)    Check blood sugar annually. Cholesterol annually unless you have had a normal level when last checked within 5 years.     I recommend that you have a general physical exam every year. You should have a pap test every 3 years between the ages of 21 and 30 and every 3-5 years between the ages of 30 and 65 depending  on your test unless you have had previous abnormal pap smears, (in these cases the exams and PAP's should be done on a schedule as recommended by your primary care provider). If you have had hysterectomy in the past, your future Pap plan may be different.            Diana Hernandez PA-C PA-C..................10/9/2020 10:53 AM

## 2020-10-09 NOTE — PATIENT INSTRUCTIONS
"Healthy Strategies  1. Eat at least 3 meals a day and never skip breakfast.  2. Eat more slowly.  3. Decrease portion size.  4. Provide structure by using meal replacement bars or shakes, and/or low calorie frozen meals.  5. For good nutrition incorporate fruit, vegetables, whole grains, lean protein, and low-fat dairy.  6. Remove trigger foods from yourenvironment to avoid impulse eating.  7. Increase physical activity: get a pedometer and aim for 10,000 steps a day or 30-35 minutes of activity 5 days per week.  8. Weigh yourself daily or at least weekly.  9. Keep a record of what you eat and your activity.  10. Establish a support system such as afriend, group or program.    11. Read Edenilson Corral's \"Eat to Live\". Remember it is important to have a minimum of 1200 calories a day, okay to use olive oil, 40 grams of fiber daily. No more than two servings (the size of your palm) of red meat a week.     Please consider the following general health recommendations:    Schedule a mammogram annually starting at the age of 40 years old unless recommended earlier by your primary care provider. Come for a general exam once yearly.    Eat a quality diet (generally, low in simple sugars, starches, cholesterol and saturated fat.)    Please get 1200 mg of calcium in divided doses with 800 units vitamin D in your diet daily. Take supplements as needed to obtain full recommended amounts.     Stay physically active. Regular walking or other exercise is one of the best ways to minimize pain of arthritis; maintain independence and mobility; maintain bone strength; maintain conditioning of your heart. Find something you enjoy and a friend to do it with you.    Maintain ideal weight. Your Body mass index is Body mass index is 31.35 kg/m .. Generally a BMI of 20-25 is considered ideal. Overweight is defined as 25-30, obese is 30-35 and markedly obese is greater than 35.    Apply sun block (SPF 25 or greater) on exposed skin anytime you " are out in the sun to prevent skin cancer.     Wear a seatbelt whenever you are in a car.    Colonoscopy (an exam of the colon) is recommended every 10 years after the age of 50 to screen for colon cancer. (More often if you are at increased risk.)    Obtain a flu shot every fall.    You should have a tetanus booster at least once every 10 years.    A vaccine to reduce your chances of getting shingles is available if you are over 50. Information about the vaccine is available through the clinic or at:  (https://www.cdc.gov/vaccines/vpd/shingles/public/shingrix/index.html)    Check blood sugar annually. Cholesterol annually unless you have had a normal level when last checked within 5 years.     I recommend that you have a general physical exam every year. You should have a pap test every 3 years between the ages of 21 and 30 and every 3-5 years between the ages of 30 and 65 depending on your test unless you have had previous abnormal pap smears, (in these cases the exams and PAP's should be done on a schedule as recommended by your primary care provider). If you have had hysterectomy in the past, your future Pap plan may be different.

## 2020-10-09 NOTE — NURSING NOTE
Chief Complaint   Patient presents with     Breast Pain     Patient here for breast exam. Patient has no complaints.  Medication Reconciliation complete.   Solange Gray LPN  ..................10/9/2020   10:55 AM

## 2020-10-27 ENCOUNTER — HOSPITAL ENCOUNTER (OUTPATIENT)
Dept: MAMMOGRAPHY | Facility: OTHER | Age: 50
Discharge: HOME | End: 2020-10-27
Attending: PHYSICIAN ASSISTANT | Admitting: PHYSICIAN ASSISTANT
Payer: COMMERCIAL

## 2020-10-27 DIAGNOSIS — Z12.31 VISIT FOR SCREENING MAMMOGRAM: ICD-10-CM

## 2020-10-27 PROCEDURE — 77063 BREAST TOMOSYNTHESIS BI: CPT

## 2020-10-28 ENCOUNTER — HOSPITAL ENCOUNTER (OUTPATIENT)
Dept: ULTRASOUND IMAGING | Facility: OTHER | Age: 50
Discharge: HOME | End: 2020-10-28
Attending: PHYSICIAN ASSISTANT | Admitting: PHYSICIAN ASSISTANT
Payer: COMMERCIAL

## 2020-10-28 DIAGNOSIS — R92.8 ABNORMAL FINDING ON BREAST IMAGING: ICD-10-CM

## 2020-10-28 PROCEDURE — 76642 ULTRASOUND BREAST LIMITED: CPT | Mod: RT

## 2021-01-25 DIAGNOSIS — R73.03 PREDIABETES: Primary | ICD-10-CM

## 2021-01-25 DIAGNOSIS — E78.2 MIXED HYPERLIPIDEMIA: ICD-10-CM

## 2021-01-25 DIAGNOSIS — I10 BENIGN ESSENTIAL HYPERTENSION: ICD-10-CM

## 2021-01-26 NOTE — TELEPHONE ENCOUNTER
Lisinopril-hydrochlorothiazide (ZESTORETIC) 20-25 MG tablet  Last Written Prescription Date: 10/09/2020  Last Fill Quantity: 90,   # refills: 0  Last Office Visit: 10/09/2020-Hypertension: Refill blood pressure medication for 3 months.  Declined blood work today.  Will be seen at the [facility] for recheck along with blood work.  Future Office visit:       Routing refill request to provider for review/approval because:  Failed - No normal serum creatinine on file in past 12 months   Recent Labs   Lab Test 08/05/19  0940   CR 0.75         Failed - No normal serum potassium on file in past 12 months   Recent Labs   Lab Test 08/05/19  0940   POTASSIUM 3.9            Unable to complete prescription refill per RNMedication Refill Policy.................... Norma Ch RN ....................  1/26/2021   8:18 AM

## 2021-01-27 RX ORDER — LISINOPRIL AND HYDROCHLOROTHIAZIDE 20; 25 MG/1; MG/1
TABLET ORAL
Qty: 90 TABLET | Refills: 0 | Status: SHIPPED | OUTPATIENT
Start: 2021-01-27 | End: 2021-08-10

## 2021-01-27 NOTE — TELEPHONE ENCOUNTER
Refilled blood pressure medication for 3 months.  Needs to have lab work completed.    We previously discussed that she was going to the free clinic to get her blood work completed.  Has she completed the blood work?    I placed orders for lab only appointment if she would like to have the blood work completed here.  Diana Hernandez PA-C.......... 1/27/2021 9:42 AM

## 2021-01-28 NOTE — TELEPHONE ENCOUNTER
Patient notified, will have lab at [facility] next week.  Mickie Barrera LPN ...... 1/28/2021 2:30 PM

## 2021-04-13 ENCOUNTER — RESULTS ONLY (OUTPATIENT)
Dept: LAB | Age: 51
End: 2021-04-13

## 2021-04-13 ENCOUNTER — APPOINTMENT (OUTPATIENT)
Dept: LAB | Facility: OTHER | Age: 51
End: 2021-04-13
Attending: NURSE PRACTITIONER

## 2021-04-13 ENCOUNTER — MEDICAL CORRESPONDENCE (OUTPATIENT)
Dept: HEALTH INFORMATION MANAGEMENT | Facility: OTHER | Age: 51
End: 2021-04-13

## 2021-04-13 LAB
ALBUMIN SERPL-MCNC: 4.7 G/DL (ref 3.5–5.7)
ALP SERPL-CCNC: 98 U/L (ref 34–104)
ALT SERPL W P-5'-P-CCNC: 23 U/L (ref 7–52)
ANION GAP SERPL CALCULATED.3IONS-SCNC: 8 MMOL/L (ref 3–14)
AST SERPL W P-5'-P-CCNC: 17 U/L (ref 13–39)
BILIRUB SERPL-MCNC: 0.5 MG/DL (ref 0.3–1)
BUN SERPL-MCNC: 22 MG/DL (ref 7–25)
CALCIUM SERPL-MCNC: 10.1 MG/DL (ref 8.6–10.3)
CHLORIDE SERPL-SCNC: 96 MMOL/L (ref 98–107)
CHOLEST SERPL-MCNC: 233 MG/DL
CO2 SERPL-SCNC: 31 MMOL/L (ref 21–31)
CREAT SERPL-MCNC: 1.06 MG/DL (ref 0.6–1.2)
GFR SERPL CREATININE-BSD FRML MDRD: 55 ML/MIN/{1.73_M2}
GLUCOSE SERPL-MCNC: 232 MG/DL (ref 70–105)
POTASSIUM SERPL-SCNC: 3.8 MMOL/L (ref 3.5–5.1)
PROT SERPL-MCNC: 7.4 G/DL (ref 6.4–8.9)
SODIUM SERPL-SCNC: 135 MMOL/L (ref 134–144)

## 2021-04-23 ENCOUNTER — HOSPITAL ENCOUNTER (OUTPATIENT)
Dept: MAMMOGRAPHY | Facility: OTHER | Age: 51
End: 2021-04-23
Attending: PHYSICIAN ASSISTANT
Payer: COMMERCIAL

## 2021-04-23 ENCOUNTER — MEDICAL CORRESPONDENCE (OUTPATIENT)
Dept: HEALTH INFORMATION MANAGEMENT | Facility: OTHER | Age: 51
End: 2021-04-23

## 2021-04-23 ENCOUNTER — RESULTS ONLY (OUTPATIENT)
Dept: LAB | Age: 51
End: 2021-04-23

## 2021-04-23 ENCOUNTER — APPOINTMENT (OUTPATIENT)
Dept: LAB | Facility: OTHER | Age: 51
End: 2021-04-23
Attending: NURSE PRACTITIONER

## 2021-04-23 DIAGNOSIS — Z09 FOLLOW-UP EXAM, 3-6 MONTHS SINCE PREVIOUS EXAM: ICD-10-CM

## 2021-04-23 DIAGNOSIS — R92.8 ABNORMAL FINDING ON BREAST IMAGING: ICD-10-CM

## 2021-04-23 LAB — HBA1C MFR BLD: 8.6 % (ref 4–6)

## 2021-04-23 PROCEDURE — G0279 TOMOSYNTHESIS, MAMMO: HCPCS

## 2021-06-24 ENCOUNTER — TRANSFERRED RECORDS (OUTPATIENT)
Dept: HEALTH INFORMATION MANAGEMENT | Facility: OTHER | Age: 51
End: 2021-06-24

## 2021-08-05 ENCOUNTER — LAB REQUISITION (OUTPATIENT)
Dept: LAB | Facility: OTHER | Age: 51
End: 2021-08-05

## 2021-08-05 LAB
CHOLEST SERPL-MCNC: 273 MG/DL
HBA1C MFR BLD: 6.6 % (ref 4–6.2)

## 2021-08-05 PROCEDURE — 83036 HEMOGLOBIN GLYCOSYLATED A1C: CPT | Performed by: FAMILY MEDICINE

## 2021-08-05 PROCEDURE — 36415 COLL VENOUS BLD VENIPUNCTURE: CPT | Performed by: FAMILY MEDICINE

## 2021-08-05 PROCEDURE — 82465 ASSAY BLD/SERUM CHOLESTEROL: CPT | Performed by: FAMILY MEDICINE

## 2021-08-10 ENCOUNTER — MYC REFILL (OUTPATIENT)
Dept: FAMILY MEDICINE | Facility: OTHER | Age: 51
End: 2021-08-10

## 2021-08-10 DIAGNOSIS — I10 BENIGN ESSENTIAL HYPERTENSION: ICD-10-CM

## 2021-08-25 RX ORDER — LISINOPRIL AND HYDROCHLOROTHIAZIDE 20; 25 MG/1; MG/1
1 TABLET ORAL DAILY
Qty: 90 TABLET | Refills: 1 | Status: SHIPPED | OUTPATIENT
Start: 2021-08-25 | End: 2022-04-20

## 2021-08-25 NOTE — TELEPHONE ENCOUNTER
Requested Prescriptions   Pending Prescriptions Disp Refills     lisinopril-hydrochlorothiazide (ZESTORETIC) 20-25 MG tablet 90 tablet 0     Sig: Take 1 tablet by mouth daily   Last Prescription Date:   1/27/21  Last Fill Qty/Refills:         90, R-0  Last Office Visit:              10/9/20   Future Office visit:           None    Unable to complete prescription refill per RN Medication Refill Policy. Ellen Son RN .............. 8/25/2021  4:42 PM

## 2021-12-03 ENCOUNTER — LAB REQUISITION (OUTPATIENT)
Dept: LAB | Facility: OTHER | Age: 51
End: 2021-12-03

## 2021-12-03 ENCOUNTER — LAB (OUTPATIENT)
Dept: LAB | Facility: OTHER | Age: 51
End: 2021-12-03
Attending: FAMILY MEDICINE

## 2021-12-03 DIAGNOSIS — Z13.1 ENCOUNTER FOR SCREENING FOR DIABETES MELLITUS: ICD-10-CM

## 2021-12-03 LAB
CHOLEST SERPL-MCNC: 211 MG/DL
FASTING STATUS PATIENT QL REPORTED: NO
HBA1C MFR BLD: 6.1 % (ref 4–6.2)
HDLC SERPL-MCNC: 37 MG/DL (ref 23–92)
LDLC SERPL CALC-MCNC: 130 MG/DL
NONHDLC SERPL-MCNC: 174 MG/DL
TRIGL SERPL-MCNC: 220 MG/DL

## 2021-12-03 PROCEDURE — 83036 HEMOGLOBIN GLYCOSYLATED A1C: CPT | Performed by: FAMILY MEDICINE

## 2021-12-03 PROCEDURE — 80061 LIPID PANEL: CPT | Performed by: FAMILY MEDICINE

## 2021-12-03 PROCEDURE — 36415 COLL VENOUS BLD VENIPUNCTURE: CPT | Performed by: FAMILY MEDICINE

## 2021-12-04 ENCOUNTER — HEALTH MAINTENANCE LETTER (OUTPATIENT)
Age: 51
End: 2021-12-04

## 2021-12-27 DIAGNOSIS — R73.03 PREDIABETES: Primary | ICD-10-CM

## 2021-12-28 NOTE — TELEPHONE ENCOUNTER
metFORMIN HCl 500 MG Oral Tablet       Last Written Prescription Date:  6/18/21  Last Fill Quantity: 90,   # refills: 1  Last Office Visit: 10/9/20  Future Office visit:       Routing refill request to provider for review/approval because:  Drug failed the [facility] or [facility] refill protocol or controlled substance.    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  Patient is due per historical meds.     Unable to complete prescription refill per RNMedication Refill Policy.................... Kristen Arevalo RN ....................  12/28/2021   9:49 AM

## 2022-03-11 ENCOUNTER — LAB REQUISITION (OUTPATIENT)
Dept: LAB | Facility: OTHER | Age: 52
End: 2022-03-11

## 2022-03-11 LAB — HBA1C MFR BLD: 6.2 % (ref 4–6.2)

## 2022-03-11 PROCEDURE — 36415 COLL VENOUS BLD VENIPUNCTURE: CPT | Performed by: FAMILY MEDICINE

## 2022-03-11 PROCEDURE — 83036 HEMOGLOBIN GLYCOSYLATED A1C: CPT | Performed by: FAMILY MEDICINE

## 2022-04-01 DIAGNOSIS — R73.03 PREDIABETES: ICD-10-CM

## 2022-04-01 NOTE — TELEPHONE ENCOUNTER
Please call  Refill medication.  Needs to be seen in clinic for medication recheck/physical for recheck prior to future refills.  Diana Hernandez PA-C.......... 4/1/2022 4:01 PM

## 2022-04-01 NOTE — TELEPHONE ENCOUNTER
[facility] sent Rx request for the following:      Requested Prescriptions   Pending Prescriptions Disp Refills     metFORMIN (GLUCOPHAGE) 500 MG tablet [Pharmacy Med Name: metFORMIN HCl 500 MG Oral Tablet] 180 tablet 0     Sig: TAKE 1 TABLET BY MOUTH TWICE DAILY WITH MEALS     Last Prescription Date:   12/28/21  Last Fill Qty/Refills:         180, R-0    Last Office Visit:              10/9/20   Future Office visit:           None  Patient overdue for annual exam, letter sent.  Unable to complete prescription refill per RN Medication Refill Policy.   Yaritza Barreto RN on 4/1/2022 at 10:40 AM

## 2022-04-01 NOTE — LETTER
April 1, 2022      Shonda Schmid  [address]  [place] [address]        Dear Shonda,         This letter is to remind you that you are due for your annual exam with Diana Hernandez. Your last comprehensive visit was more than 12 months ago.    Please call the clinic at [phone] to schedule your appointment.    Thank you for choosing [facility] and [facility] for your health care needs.    Sincerely,    Refill DARIANA  [facility]

## 2022-04-01 NOTE — LETTER
April 15, 2022      Shonda Schmid  [address]  [place] [address]        Dear Ms.Shunmyra,    We are writing to inform you that you are due for an appointment. You will need to be seen in order to get further refills on your medication. Please call [phone] to schedule.     If you have any questions or concerns, please call the clinic at the number listed above.       Sincerely,        MEG Riojas

## 2022-04-08 ENCOUNTER — TELEPHONE (OUTPATIENT)
Dept: FAMILY MEDICINE | Facility: OTHER | Age: 52
End: 2022-04-08

## 2022-04-08 ENCOUNTER — MYC REFILL (OUTPATIENT)
Dept: INTERNAL MEDICINE | Facility: OTHER | Age: 52
End: 2022-04-08

## 2022-04-08 DIAGNOSIS — R73.03 PREDIABETES: ICD-10-CM

## 2022-04-08 NOTE — TELEPHONE ENCOUNTER
Please call the patient.  Her RX - Metformin  Will run out quick.  She takes it twice a day.   Can she get another refill.   Her pharmacy is [facility].      Diana Horvath on 4/8/2022 at 11:12 AM

## 2022-04-08 NOTE — TELEPHONE ENCOUNTER
Called patient and informed her that we received her [employer] message and that it would be forwarded to her PCP. She receives her care through the [facility] and hasn;t been seen at [facility] since October 2020. Mac Vaz RN, BSN  ....................  4/8/2022   11:23 AM

## 2022-04-08 NOTE — TELEPHONE ENCOUNTER
Refilled Rx.   Please have her connect with [facility] to discuss further refills.   Diana Hernandez PA-C ..................4/8/2022 12:37 PM

## 2022-04-08 NOTE — TELEPHONE ENCOUNTER
Called and notified patient of rx refill and to reach out to [facility] for future refills.  ....Miriam Rodriguez LPN on 4/8/2022 at 1:18 PM

## 2022-04-17 DIAGNOSIS — I10 BENIGN ESSENTIAL HYPERTENSION: ICD-10-CM

## 2022-04-19 NOTE — TELEPHONE ENCOUNTER
"MaryDiana  Encounter also routed to [facility] to schedule an office visit. Beatrice Verdin RN on 4/19/2022 at 8:40 AM    Last Prescription Date: 8/25/21  Last Qty/Refills: 90 / 1  Last Office Visit: 10/9/2020  Future Office Visit: none     Requested Prescriptions   Pending Prescriptions Disp Refills     lisinopril-hydrochlorothiazide (ZESTORETIC) 20-25 MG tablet [Pharmacy Med Name: Lisinopril-hydroCHLOROthiazide 20-25 MG Oral Tablet] 90 tablet 0     Sig: Take 1 tablet by mouth once daily       Diuretics (Including Combos) Protocol Failed - 4/17/2022 10:27 AM        Failed - Blood pressure under 140/90 in past 12 months     BP Readings from Last 3 Encounters:   10/09/20 118/78   08/05/19 132/82   04/19/18 132/70             Failed - Recent (12 mo) or future (30 days) visit within the authorizing provider's specialty     Patient has had an office visit with the authorizing provider or a provider within the authorizing providers department within the previous 12 mos or has a future within next 30 days. See \"Patient Info\" tab in inbasket, or \"Choose Columns\" in Meds & Orders section of the refill encounter.              Failed - Normal serum creatinine on file in past 12 months     Recent Labs   Lab Test 04/13/21  1825   CR 1.06              Failed - Normal serum potassium on file in past 12 months     Recent Labs   Lab Test 04/13/21  1825   POTASSIUM 3.8                    Failed - Normal serum sodium on file in past 12 months     Recent Labs   Lab Test 04/13/21  1825                 Passed - Medication is active on med list        Passed - Patient is age 18 or older        Passed - No active pregancy on record        Passed - No positive pregnancy test in past 12 months       ACE Inhibitors (Including Combos) Protocol Failed - 4/17/2022 10:27 AM        Failed - Blood pressure under 140/90 in past 12 months     BP Readings from Last 3 Encounters:   10/09/20 118/78   08/05/19 132/82   04/19/18 " "132/70                 Failed - Recent (12 mo) or future (30 days) visit within the authorizing provider's specialty     Patient has had an office visit with the authorizing provider or a provider within the authorizing providers department within the previous 12 mos or has a future within next 30 days. See \"Patient Info\" tab in inbasket, or \"Choose Columns\" in Meds & Orders section of the refill encounter.              Failed - Normal serum creatinine on file in past 12 months     Recent Labs   Lab Test 04/13/21  1825   CR 1.06       Ok to refill medication if creatinine is low          Failed - Normal serum potassium on file in past 12 months     Recent Labs   Lab Test 04/13/21  1825   POTASSIUM 3.8             Passed - Medication is active on med list        Passed - Patient is age 18 or older        Passed - No active pregnancy on record        Passed - No positive pregnancy test within past 12 months             "

## 2022-04-20 RX ORDER — LISINOPRIL AND HYDROCHLOROTHIAZIDE 20; 25 MG/1; MG/1
TABLET ORAL
Qty: 90 TABLET | Refills: 0 | Status: SHIPPED | OUTPATIENT
Start: 2022-04-20 | End: 2023-01-27

## 2022-04-20 NOTE — TELEPHONE ENCOUNTER
Refilled Rx.   Needs physical for recheck.   SUZANNA Lainez.................4/20/2022 8:34 AM

## 2022-04-28 NOTE — TELEPHONE ENCOUNTER
Notified patient of providers note.  Patience Duncan LPN ....................  4/28/2022   8:50 AM

## 2022-05-25 DIAGNOSIS — R73.03 PREDIABETES: ICD-10-CM

## 2022-05-27 NOTE — TELEPHONE ENCOUNTER
"[facility] sent Rx request for the following:   metFORMIN (GLUCOPHAGE) 500 MG tablet  SigTAKE 1 TABLET BY MOUTH TWICE DAILY WITH MEALS    Last Prescription Date:   04/08/2022  Last Fill Qty/Refills:         60, R-0    Last Office Visit:              10/2020   Future Office visit:           None noted   Biguanide Agents Failed - 5/25/2022  7:24 PM        Failed - Patient's CR is NOT>1.4 OR Patient's EGFR is NOT<45 within past 12 mos.     Recent Labs   Lab Test 04/13/21  1825   GFRESTIMATED 55*   GFRESTBLACK 66       Recent Labs   Lab Test 04/13/21  1825   CR 1.06             Failed - Recent (6 mo) or future (30 days) visit within the authorizing provider's specialty     Patient had office visit in the last 6 months or has a visit in the next 30 days with authorizing provider or within the authorizing provider's specialty.  See \"Patient Info\" tab in inbasket, or \"Choose Columns\" in Meds & Orders section of the refill encounter.            Passed - Patient is age 10 or older        Passed - Patient has documented A1c within the specified period of time.     If HgbA1C is 8 or greater, it needs to be on file within the past 3 months.  If less than 8, must be on file within the past 6 months.     Recent Labs   Lab Test 03/11/22  1152   A1C 6.2             Passed - Patient does NOT have a diagnosis of CHF.        Passed - Medication is active on med list        Passed - Patient is not pregnant        Passed - Patient has not had a positive pregnancy test within the past 12 mos.            Patient receives Rx through [facility] and has not been seen since October 2020. Unable to complete prescription refill per RN Medication Refill Policy.................... Aye Silverio RN ....................  5/27/2022   1:34 PM          "

## 2022-05-27 NOTE — CONFIDENTIAL NOTE
Refilled medication.  Needs to be seen in clinic for a physical/medication recheck prior to future refills.  Diana Hernandez PA-C.......... 5/27/2022 4:36 PM

## 2022-07-16 ENCOUNTER — HEALTH MAINTENANCE LETTER (OUTPATIENT)
Age: 52
End: 2022-07-16

## 2022-08-04 ENCOUNTER — LAB REQUISITION (OUTPATIENT)
Dept: LAB | Facility: OTHER | Age: 52
End: 2022-08-04

## 2022-08-04 DIAGNOSIS — E11.8 TYPE 2 DIABETES MELLITUS WITH UNSPECIFIED COMPLICATIONS (H): ICD-10-CM

## 2022-08-04 DIAGNOSIS — I10 ESSENTIAL (PRIMARY) HYPERTENSION: ICD-10-CM

## 2022-08-04 LAB
ANION GAP SERPL CALCULATED.3IONS-SCNC: 8 MMOL/L (ref 3–14)
BUN SERPL-MCNC: 23 MG/DL (ref 7–25)
CALCIUM SERPL-MCNC: 9.8 MG/DL (ref 8.6–10.3)
CHLORIDE BLD-SCNC: 101 MMOL/L (ref 98–107)
CHOLEST SERPL-MCNC: 176 MG/DL
CO2 SERPL-SCNC: 30 MMOL/L (ref 21–31)
CREAT SERPL-MCNC: 0.86 MG/DL (ref 0.6–1.2)
FASTING STATUS PATIENT QL REPORTED: ABNORMAL
GFR SERPL CREATININE-BSD FRML MDRD: 80 ML/MIN/1.73M2
GLUCOSE BLD-MCNC: 113 MG/DL (ref 70–105)
HBA1C MFR BLD: 6.2 % (ref 4–6.2)
HDLC SERPL-MCNC: 37 MG/DL (ref 23–92)
LDLC SERPL CALC-MCNC: 95 MG/DL
NONHDLC SERPL-MCNC: 139 MG/DL
POTASSIUM BLD-SCNC: 3.8 MMOL/L (ref 3.5–5.1)
SODIUM SERPL-SCNC: 139 MMOL/L (ref 134–144)
TRIGL SERPL-MCNC: 221 MG/DL

## 2022-08-04 PROCEDURE — 80061 LIPID PANEL: CPT | Performed by: FAMILY MEDICINE

## 2022-08-04 PROCEDURE — 80048 BASIC METABOLIC PNL TOTAL CA: CPT | Performed by: FAMILY MEDICINE

## 2022-08-04 PROCEDURE — 83036 HEMOGLOBIN GLYCOSYLATED A1C: CPT | Performed by: FAMILY MEDICINE

## 2023-01-27 ENCOUNTER — OFFICE VISIT (OUTPATIENT)
Dept: FAMILY MEDICINE | Facility: OTHER | Age: 53
End: 2023-01-27
Attending: FAMILY MEDICINE
Payer: COMMERCIAL

## 2023-01-27 VITALS
SYSTOLIC BLOOD PRESSURE: 124 MMHG | OXYGEN SATURATION: 100 % | DIASTOLIC BLOOD PRESSURE: 78 MMHG | HEART RATE: 79 BPM | TEMPERATURE: 97.3 F | BODY MASS INDEX: 28.04 KG/M2 | RESPIRATION RATE: 16 BRPM | WEIGHT: 184.4 LBS

## 2023-01-27 DIAGNOSIS — I10 BENIGN ESSENTIAL HYPERTENSION: ICD-10-CM

## 2023-01-27 DIAGNOSIS — E11.9 CONTROLLED TYPE 2 DIABETES MELLITUS WITHOUT COMPLICATION, WITHOUT LONG-TERM CURRENT USE OF INSULIN (H): Primary | ICD-10-CM

## 2023-01-27 LAB
CREAT UR-MCNC: 79.6 MG/DL
HBA1C MFR BLD: 6.3 % (ref 4–6.2)
MICROALBUMIN UR-MCNC: <12 MG/L
MICROALBUMIN/CREAT UR: NORMAL MG/G{CREAT}

## 2023-01-27 PROCEDURE — 99213 OFFICE O/P EST LOW 20 MIN: CPT | Performed by: FAMILY MEDICINE

## 2023-01-27 PROCEDURE — 82570 ASSAY OF URINE CREATININE: CPT | Mod: ZL | Performed by: FAMILY MEDICINE

## 2023-01-27 PROCEDURE — 83036 HEMOGLOBIN GLYCOSYLATED A1C: CPT | Mod: ZL | Performed by: FAMILY MEDICINE

## 2023-01-27 PROCEDURE — 36415 COLL VENOUS BLD VENIPUNCTURE: CPT | Mod: ZL | Performed by: FAMILY MEDICINE

## 2023-01-27 RX ORDER — PRAVASTATIN SODIUM 20 MG
20 TABLET ORAL DAILY
Qty: 90 TABLET | Refills: 4 | Status: SHIPPED | OUTPATIENT
Start: 2023-01-27 | End: 2023-10-05

## 2023-01-27 RX ORDER — CHOLECALCIFEROL (VITAMIN D3) 50 MCG
1 TABLET ORAL DAILY
COMMUNITY
Start: 2023-01-27

## 2023-01-27 RX ORDER — LISINOPRIL AND HYDROCHLOROTHIAZIDE 20; 25 MG/1; MG/1
1 TABLET ORAL DAILY
Qty: 90 TABLET | Refills: 4 | Status: SHIPPED | OUTPATIENT
Start: 2023-01-27 | End: 2023-10-05

## 2023-01-27 ASSESSMENT — PAIN SCALES - GENERAL: PAINLEVEL: NO PAIN (0)

## 2023-01-27 NOTE — NURSING NOTE
"Chief Complaint   Patient presents with     Diabetes     Follow up       Medication reconciliation completed.    FOOD SECURITY SCREENING QUESTIONS:    The next two questions are to help us understand your food security.  If you are feeling you need any assistance in this area, we have resources available to support you today.    Hunger Vital Signs:  Within the past 12 months we worried whether our food would run out before we got money to buy more. Never  Within the past 12 months the food we bought just didn't last and we didn't have money to get more. Never    Initial /78 (BP Location: Right arm, Patient Position: Sitting, Cuff Size: Adult Regular)   Pulse 79   Temp 97.3  F (36.3  C) (Temporal)   Resp 16   Wt 83.6 kg (184 lb 6.4 oz)   SpO2 100%   Breastfeeding No   BMI 28.04 kg/m   Estimated body mass index is 28.04 kg/m  as calculated from the following:    Height as of 8/5/19: 1.727 m (5' 8\").    Weight as of this encounter: 83.6 kg (184 lb 6.4 oz).       Sakina Booker LPN .......  1/27/2023  9:11 AM  "

## 2023-01-27 NOTE — PROGRESS NOTES
Assessment & Plan       ICD-10-CM    1. Controlled type 2 diabetes mellitus without complication, without long-term current use of insulin (H)  E11.9 Albumin Random Urine Quantitative with Creat Ratio     Hemoglobin A1c     FOOT EXAM     pravastatin (PRAVACHOL) 20 MG tablet     aspirin (ASA) 81 MG EC tablet     Hemoglobin A1c     Albumin Random Urine Quantitative with Creat Ratio      2. Benign essential hypertension  I10 lisinopril-hydrochlorothiazide (ZESTORETIC) 20-25 MG tablet        She was receiving care previously at [facility], but now has insurance.  A1c similar to past values.  Discussed increasing metformin, but with controlled A1c, no need to change.  She has a family history of heart disease.  Did not tolerate atorvastatin due to myalgias.  Recommend trying pravastatin 20 mg daily instead.  Continue aspirin for primary prevention.  Urine microalbumin normal.    Blood pressure controlled on current lisinopril hydrochlorothiazide dose.  Last labs were August 2022, refilled medication.    Return in about 6 months (around 7/27/2023).    Ethan Garcia MD  [facility]    Tammy Merchant is a 53 year old, presenting for the following health issues:  Diabetes (Follow up)      History of Present Illness       Diabetes:   She presents for follow up of diabetes.  She is checking home blood glucose one time daily. She checks blood glucose at bedtime.  Blood glucose is never over 200 and never under 70. She is aware of hypoglycemia symptoms including shakiness. She is concerned about other. She is not experiencing numbness or burning in feet, excessive thirst, blurry vision, weight changes or redness, sores or blisters on feet. The patient has not had a diabetic eye exam in the last 12 months.         She eats 2-3 servings of fruits and vegetables daily.She consumes 1 sweetened beverage(s) daily.She exercises with enough effort to increase her heart rate 30 to 60 minutes per day.   She exercises with enough effort to increase her heart rate 3 or less days per week.   She is taking medications regularly.       Diabetes Follow-up    How often are you checking your blood sugar? One time daily  What time of day are you checking your blood sugars (select all that apply)?  At bedtime  Have you had any blood sugars above 200?  No  Have you had any blood sugars below 70?  No    What symptoms do you notice when your blood sugar is low?  None and Not applicable    What concerns do you have today about your diabetes? None and Other: yes questions regarding work schedule and medication     Do you have any of these symptoms? (Select all that apply)  No numbness or tingling in feet.  No redness, sores or blisters on feet.  No complaints of excessive thirst.  No reports of blurry vision.  No significant changes to weight.    Have you had a diabetic eye exam in the last 12 months? No        BP Readings from Last 2 Encounters:   01/27/23 124/78   10/09/20 118/78     Hemoglobin A1C (%)   Date Value   01/27/2023 6.3 (H)   08/04/2022 6.2   04/23/2021 8.6 (H)   08/05/2019 6.3 (H)     LDL Cholesterol Calculated (mg/dL)   Date Value   08/04/2022 95   12/03/2021 130 (H)   10/09/2018 142 (H)       Stopped atorvastatin due to myalgias. Symptoms resolved    Eating more sugar over the holidays      Review of Systems   General: Denies general constitutional problems  Cardiovascular: Denies problems  Respiratory: Denies problems       Objective    /78 (BP Location: Right arm, Patient Position: Sitting, Cuff Size: Adult Regular)   Pulse 79   Temp 97.3  F (36.3  C) (Temporal)   Resp 16   Wt 83.6 kg (184 lb 6.4 oz)   SpO2 100%   Breastfeeding No   BMI 28.04 kg/m    Body mass index is 28.04 kg/m .  Physical Exam   General Appearance: Alert. No acute distress  Chest/Respiratory Exam: Clear to auscultation bilaterally  Cardiovascular Exam: Regular rate and rhythm. S1, S2, no murmur, gallop, or rubs.  Extremities: No  lower extremity edema.  Foot Exam: monofilament sensation normal  Psychiatric: Normal affect and mentation      Results for orders placed or performed in visit on 01/27/23   Hemoglobin A1c     Status: Abnormal   Result Value Ref Range    Hemoglobin A1C 6.3 (H) 4.0 - 6.2 %   Albumin Random Urine Quantitative with Creat Ratio     Status: None   Result Value Ref Range    Creatinine Urine mg/dL 79.6 mg/dL    Albumin Urine mg/L <12.0 mg/L    Albumin Urine mg/g Cr

## 2023-03-17 ENCOUNTER — HOSPITAL ENCOUNTER (OUTPATIENT)
Dept: MAMMOGRAPHY | Facility: OTHER | Age: 53
Discharge: HOME | End: 2023-03-17
Attending: PHYSICIAN ASSISTANT | Admitting: PHYSICIAN ASSISTANT
Payer: COMMERCIAL

## 2023-03-17 DIAGNOSIS — Z12.31 VISIT FOR SCREENING MAMMOGRAM: ICD-10-CM

## 2023-03-17 PROCEDURE — 77067 SCR MAMMO BI INCL CAD: CPT

## 2023-04-14 ENCOUNTER — OFFICE VISIT (OUTPATIENT)
Dept: FAMILY MEDICINE | Facility: OTHER | Age: 53
End: 2023-04-14
Attending: FAMILY MEDICINE
Payer: COMMERCIAL

## 2023-04-14 VITALS
HEIGHT: 69 IN | TEMPERATURE: 97.4 F | OXYGEN SATURATION: 99 % | WEIGHT: 180 LBS | BODY MASS INDEX: 26.66 KG/M2 | RESPIRATION RATE: 20 BRPM | DIASTOLIC BLOOD PRESSURE: 60 MMHG | SYSTOLIC BLOOD PRESSURE: 118 MMHG | HEART RATE: 72 BPM

## 2023-04-14 DIAGNOSIS — L72.3 SEBACEOUS CYST: ICD-10-CM

## 2023-04-14 DIAGNOSIS — D22.9 MULTIPLE NEVI: Primary | ICD-10-CM

## 2023-04-14 PROCEDURE — 99213 OFFICE O/P EST LOW 20 MIN: CPT | Performed by: FAMILY MEDICINE

## 2023-04-14 ASSESSMENT — PAIN SCALES - GENERAL: PAINLEVEL: NO PAIN (0)

## 2023-04-14 NOTE — NURSING NOTE
"Patient presents to the clinic for skin check of the back.    FOOD SECURITY SCREENING QUESTIONS:    The next two questions are to help us understand your food security.  If you are feeling you need any assistance in this area, we have resources available to support you today.    Hunger Vital Signs:  Within the past 12 months we worried whether our food would run out before we got money to buy more. Never  Within the past 12 months the food we bought just didn't last and we didn't have money to get more. Never    Advance Care Directive on file? no  Advance Care Directive provided to patient? Declined.    Lab Results   Component Value Date    A1C 6.3 01/27/2023    A1C 6.2 08/04/2022    A1C 6.2 03/11/2022    A1C 8.6 04/23/2021    A1C 6.3 08/05/2019    A1C 5.9 10/09/2018    ALBUMIN 4.7 04/13/2021     Previous A1C is at goal of <8  Date of last Foot exam 1-27-23  Eye exam No  Patient is not a Tobacco User  Patient is on a daily aspirin  Patient is on a Statin.  Blood pressure today of:     BP Readings from Last 1 Encounters:   04/14/23 118/60      is at the goal of <139/89 for diabetics.    Chief Complaint   Patient presents with     Derm Problem       Initial /60 (BP Location: Right arm, Patient Position: Sitting, Cuff Size: Adult Regular)   Pulse 72   Temp 97.4  F (36.3  C) (Tympanic)   Resp 20   Ht 1.74 m (5' 8.5\")   Wt 81.6 kg (180 lb)   LMP 01/01/2019 (Within Months)   SpO2 99%   BMI 26.97 kg/m   Estimated body mass index is 26.97 kg/m  as calculated from the following:    Height as of this encounter: 1.74 m (5' 8.5\").    Weight as of this encounter: 81.6 kg (180 lb).  Medication Reconciliation: complete        Lin Sewell LPN         "

## 2023-04-14 NOTE — PROGRESS NOTES
Assessment & Plan       ICD-10-CM    1. Multiple nevi  D22.9 Adult Dermatology Referral     CANCELED: Adult Dermatology Referral      2. Sebaceous cyst  L72.3         She has multiple nevi, more on the upper thoracic region.  Also has 1 small dark symmetric nevus on right low back.  Took pictures with her phone so she can compare to for future changes.  We discussed putting a picture in the chart at [facility] versus seeing dermatology.  Given multitude of nevi, it would be beneficial to see dermatology.  She prefers that option as well.  Referral placed to dermatology professionals.    Very small sebaceous cyst on mid back.  This is causing irritation with pressure.  Was able to utilize splinter tool and extract the head of the cyst.  This should help the discomfort resolved.  Cautioned about potential infection.  Return if present.  Otherwise no follow-up needed for this.       Ethan Garcia MD   [facility]       Subjective   Shonda is a 53 year old, presenting for the following health issues:  Derm Problem        4/14/2023     9:28 AM   Additional Questions   Roomed by thelma cox lpn   Accompanied by -         4/14/2023     9:28 AM   Patient Reported Additional Medications   Patient reports taking the following new medications -     History of Present Illness       Reason for visit:  To check mole on back hurts onxe in a while    She eats 2-3 servings of fruits and vegetables daily.She consumes 0 sweetened beverage(s) daily.She exercises with enough effort to increase her heart rate 30 to 60 minutes per day.  She exercises with enough effort to increase her heart rate 3 or less days per week.   She is taking medications regularly.     Skin lesion on mid back, hurts when driving with pressure in the area  Has a lot of moles      Review of Systems   As above      Objective    /60 (BP Location: Right arm, Patient Position: Sitting, Cuff Size: Adult Regular)   Pulse 72   Temp 97.4  F  "(36.3  C) (Tympanic)   Resp 20   Ht 1.74 m (5' 8.5\")   Wt 81.6 kg (180 lb)   LMP 01/01/2019 (Within Months)   SpO2 99%   BMI 26.97 kg/m    Body mass index is 26.97 kg/m .  Physical Exam   General Appearance: Alert. No acute distress  Psychiatric: Normal affect and mentation  Skin: Multiple nevi, more on upper back.  Some are slightly atypical in appearance versus compound nevi  Mid thoracic region to have a very small sebaceous cyst which is the lesion causing discomfort                    "

## 2023-05-06 ENCOUNTER — HEALTH MAINTENANCE LETTER (OUTPATIENT)
Age: 53
End: 2023-05-06

## 2023-10-05 ENCOUNTER — OFFICE VISIT (OUTPATIENT)
Dept: FAMILY MEDICINE | Facility: OTHER | Age: 53
End: 2023-10-05
Attending: STUDENT IN AN ORGANIZED HEALTH CARE EDUCATION/TRAINING PROGRAM
Payer: COMMERCIAL

## 2023-10-05 VITALS
DIASTOLIC BLOOD PRESSURE: 62 MMHG | RESPIRATION RATE: 18 BRPM | BODY MASS INDEX: 27.11 KG/M2 | SYSTOLIC BLOOD PRESSURE: 110 MMHG | WEIGHT: 183 LBS | OXYGEN SATURATION: 98 % | HEART RATE: 87 BPM | HEIGHT: 69 IN | TEMPERATURE: 97.7 F

## 2023-10-05 DIAGNOSIS — E11.9 CONTROLLED TYPE 2 DIABETES MELLITUS WITHOUT COMPLICATION, WITHOUT LONG-TERM CURRENT USE OF INSULIN (H): Primary | ICD-10-CM

## 2023-10-05 DIAGNOSIS — I10 BENIGN ESSENTIAL HYPERTENSION: ICD-10-CM

## 2023-10-05 LAB
ANION GAP SERPL CALCULATED.3IONS-SCNC: 9 MMOL/L (ref 7–15)
BUN SERPL-MCNC: 24.7 MG/DL (ref 6–20)
CALCIUM SERPL-MCNC: 10.2 MG/DL (ref 8.6–10)
CHLORIDE SERPL-SCNC: 97 MMOL/L (ref 98–107)
CREAT SERPL-MCNC: 0.83 MG/DL (ref 0.51–0.95)
DEPRECATED HCO3 PLAS-SCNC: 30 MMOL/L (ref 22–29)
EGFRCR SERPLBLD CKD-EPI 2021: 83 ML/MIN/1.73M2
GLUCOSE SERPL-MCNC: 136 MG/DL (ref 70–99)
HBA1C MFR BLD: 6.4 % (ref 4–6.2)
POTASSIUM SERPL-SCNC: 4 MMOL/L (ref 3.4–5.3)
SODIUM SERPL-SCNC: 136 MMOL/L (ref 135–145)

## 2023-10-05 PROCEDURE — 99214 OFFICE O/P EST MOD 30 MIN: CPT | Performed by: STUDENT IN AN ORGANIZED HEALTH CARE EDUCATION/TRAINING PROGRAM

## 2023-10-05 PROCEDURE — 80048 BASIC METABOLIC PNL TOTAL CA: CPT | Mod: ZL | Performed by: STUDENT IN AN ORGANIZED HEALTH CARE EDUCATION/TRAINING PROGRAM

## 2023-10-05 PROCEDURE — 36415 COLL VENOUS BLD VENIPUNCTURE: CPT | Mod: ZL | Performed by: STUDENT IN AN ORGANIZED HEALTH CARE EDUCATION/TRAINING PROGRAM

## 2023-10-05 PROCEDURE — 83036 HEMOGLOBIN GLYCOSYLATED A1C: CPT | Mod: ZL | Performed by: STUDENT IN AN ORGANIZED HEALTH CARE EDUCATION/TRAINING PROGRAM

## 2023-10-05 RX ORDER — LISINOPRIL 20 MG/1
20 TABLET ORAL DAILY
Qty: 90 TABLET | Refills: 3 | Status: SHIPPED | OUTPATIENT
Start: 2023-10-05 | End: 2024-03-21

## 2023-10-05 ASSESSMENT — PAIN SCALES - GENERAL: PAINLEVEL: NO PAIN (0)

## 2023-10-05 NOTE — NURSING NOTE
Patient presents to clinic for follow up diabetic check.  Medication Reconciliation: Complete    Ellen Velazquez LPN  10/5/2023 2:57 PM

## 2023-10-05 NOTE — PROGRESS NOTES
"  Assessment & Plan   Problem List Items Addressed This Visit          Endocrine    Controlled type 2 diabetes mellitus without complication, without long-term current use of insulin (H) - Primary    Relevant Medications    lisinopril (ZESTRIL) 20 MG tablet    metFORMIN (GLUCOPHAGE) 500 MG tablet    Other Relevant Orders    Hemoglobin A1c (Completed)    Basic Metabolic Panel (Completed)       Circulatory    Benign essential hypertension    Relevant Medications    lisinopril (ZESTRIL) 20 MG tablet        Diabets at goal with A1c 6.4. refilled metformin. BP well under goal, will discontinue the hydrochlorothiazide and continue lisinopril as she needs this for kidney protection             BMI:   Estimated body mass index is 27.42 kg/m  as calculated from the following:    Height as of this encounter: 1.74 m (5' 8.5\").    Weight as of this encounter: 83 kg (183 lb).           No follow-ups on file.    Luma Rees MD  [facility]    Tammy Merchant is a 53 year old, presenting for the following health issues:  Diabetes (Follow up check)      History of Present Illness       Diabetes:   She presents for follow up of diabetes.  She is checking home blood glucose two times daily.   She checks blood glucose after meals and at bedtime.  Blood glucose is never over 200 and never under 70. She is aware of hypoglycemia symptoms including shakiness.    She has no concerns regarding her diabetes at this time.   She is not experiencing numbness or burning in feet, excessive thirst, blurry vision, weight changes or redness, sores or blisters on feet. The patient has not had a diabetic eye exam in the last 12 months.          She eats 2-3 servings of fruits and vegetables daily.She consumes 0 sweetened beverage(s) daily.She exercises with enough effort to increase her heart rate 30 to 60 minutes per day.  She exercises with enough effort to increase her heart rate 3 or less days per week.   She " "is taking medications regularly.                 Diabetes med refills  - due for A1c and metformin refill      Blood pressure meds  - has been on the lower end, wondering aobut stopping PT meds    Review of Systems   Constitutional, HEENT, cardiovascular, pulmonary, gi and gu systems are negative, except as otherwise noted.      Objective    /62 (BP Location: Right arm, Patient Position: Sitting, Cuff Size: Adult Regular)   Pulse 87   Temp 97.7  F (36.5  C) (Tympanic)   Resp 18   Ht 1.74 m (5' 8.5\")   Wt 83 kg (183 lb)   LMP 01/01/2019 (Within Months)   SpO2 98%   BMI 27.42 kg/m    Body mass index is 27.42 kg/m .  Physical Exam   GENERAL: healthy, alert and no distress  RESP: lungs clear to auscultation - no rales, rhonchi or wheezes  CV: regular rate and rhythm, normal S1 S2, no S3 or S4, no murmur, click or rub, no peripheral edema and peripheral pulses strong  PSYCH: mentation appears normal and affect normal/bright    Results for orders placed or performed in visit on 10/05/23 (from the past 24 hour(s))   Hemoglobin A1c   Result Value Ref Range    Hemoglobin A1C 6.4 (H) 4.0 - 6.2 %   Basic Metabolic Panel   Result Value Ref Range    Sodium 136 135 - 145 mmol/L    Potassium 4.0 3.4 - 5.3 mmol/L    Chloride 97 (L) 98 - 107 mmol/L    Carbon Dioxide (CO2) 30 (H) 22 - 29 mmol/L    Anion Gap 9 7 - 15 mmol/L    Urea Nitrogen 24.7 (H) 6.0 - 20.0 mg/dL    Creatinine 0.83 0.51 - 0.95 mg/dL    GFR Estimate 83 >60 mL/min/1.73m2    Calcium 10.2 (H) 8.6 - 10.0 mg/dL    Glucose 136 (H) 70 - 99 mg/dL                     "

## 2023-10-06 ENCOUNTER — MYC MEDICAL ADVICE (OUTPATIENT)
Dept: FAMILY MEDICINE | Facility: OTHER | Age: 53
End: 2023-10-06
Payer: COMMERCIAL

## 2023-10-06 DIAGNOSIS — I10 BENIGN ESSENTIAL HYPERTENSION: ICD-10-CM

## 2023-10-09 RX ORDER — LISINOPRIL AND HYDROCHLOROTHIAZIDE 20; 25 MG/1; MG/1
1 TABLET ORAL DAILY
Qty: 90 TABLET | Refills: 4 | Status: SHIPPED | OUTPATIENT
Start: 2023-10-09 | End: 2024-03-21

## 2023-10-09 NOTE — TELEPHONE ENCOUNTER
Lisinopril last RX 10/5/23 does not need a refill.     Hydrochlorothiazide d/c'd on 10/5/23 at [facility] with Dr. Kartik Rees per BP under goal.    Tangela Rutherford RN on 10/9/2023 at 9:31 AM

## 2023-10-09 NOTE — TELEPHONE ENCOUNTER
From 10/5 [facility] note:   Diabetes at goal with A1c 6.4. refilled metformin. BP well under goal, will discontinue the hydrochlorothiazide and continue lisinopril as she needs this for kidney protection    Patient requesting the Lisinopril with Hydrochlorothiazide to be re-ordered.      Sent to [facility] for advisement/decision.     Shonda Ng RN on 10/9/2023 at 3:07 PM

## 2024-02-24 ENCOUNTER — HEALTH MAINTENANCE LETTER (OUTPATIENT)
Age: 54
End: 2024-02-24

## 2024-02-28 ENCOUNTER — MYC MEDICAL ADVICE (OUTPATIENT)
Dept: FAMILY MEDICINE | Facility: OTHER | Age: 54
End: 2024-02-28
Payer: COMMERCIAL

## 2024-02-28 DIAGNOSIS — E11.9 CONTROLLED TYPE 2 DIABETES MELLITUS WITHOUT COMPLICATION, WITHOUT LONG-TERM CURRENT USE OF INSULIN (H): Primary | ICD-10-CM

## 2024-02-28 DIAGNOSIS — E78.2 MIXED HYPERLIPIDEMIA: ICD-10-CM

## 2024-02-28 NOTE — TELEPHONE ENCOUNTER
Patient due for recheck in office.  She is also due for a Pap test.  We can change this appointment to a physical with Pap test along with diabetic recheck if she would prefer to be able to complete her Pap test.    Labs are also ordered.  She can set up a fasting lab only appointment prior to the visit where she can complete the test during the visit.  Diana Hernandez PA-C.......... 2/28/2024  2:27 PM

## 2024-02-28 NOTE — TELEPHONE ENCOUNTER
Patient has appointment on 03/08/24 with you for a diabetic check. Can she do lab ahead of time ? What would you like order ? Pennie Sutton LPN ....................2/28/2024  1:56 PM

## 2024-02-28 NOTE — TELEPHONE ENCOUNTER
Called [facility] to schedule a physical and PAP as part of 3/8 visit.     Done by [facility].     Followed up on MyChart.     Shonda Ng RN on 2/28/2024 at 3:50 PM

## 2024-02-28 NOTE — TELEPHONE ENCOUNTER
Patient Quality Outreach    Patient is due for the following:   Diabetes -  Eye Exam and Diabetic Follow-Up Visit  Physical Preventive Adult Physical    Next Steps:   Schedule a Adult Preventative    Type of outreach:    Message sent to outreach to schedule annual preventative visit.    Questions for provider review:    None           Aundrea De La Torre RN

## 2024-03-21 ENCOUNTER — OFFICE VISIT (OUTPATIENT)
Dept: FAMILY MEDICINE | Facility: OTHER | Age: 54
End: 2024-03-21
Attending: PHYSICIAN ASSISTANT
Payer: COMMERCIAL

## 2024-03-21 VITALS
SYSTOLIC BLOOD PRESSURE: 116 MMHG | HEART RATE: 97 BPM | OXYGEN SATURATION: 98 % | HEIGHT: 68 IN | TEMPERATURE: 97.3 F | BODY MASS INDEX: 27.98 KG/M2 | RESPIRATION RATE: 18 BRPM | WEIGHT: 184.6 LBS | DIASTOLIC BLOOD PRESSURE: 84 MMHG

## 2024-03-21 DIAGNOSIS — Z00.00 ROUTINE GENERAL MEDICAL EXAMINATION AT A HEALTH CARE FACILITY: Primary | ICD-10-CM

## 2024-03-21 DIAGNOSIS — E78.2 MIXED HYPERLIPIDEMIA: ICD-10-CM

## 2024-03-21 DIAGNOSIS — I10 BENIGN ESSENTIAL HYPERTENSION: ICD-10-CM

## 2024-03-21 DIAGNOSIS — E83.52 SERUM CALCIUM ELEVATED: ICD-10-CM

## 2024-03-21 DIAGNOSIS — Z01.419 PAP TEST, AS PART OF ROUTINE GYNECOLOGICAL EXAMINATION: ICD-10-CM

## 2024-03-21 DIAGNOSIS — E11.9 CONTROLLED TYPE 2 DIABETES MELLITUS WITHOUT COMPLICATION, WITHOUT LONG-TERM CURRENT USE OF INSULIN (H): ICD-10-CM

## 2024-03-21 LAB
ANION GAP SERPL CALCULATED.3IONS-SCNC: 10 MMOL/L (ref 7–15)
BUN SERPL-MCNC: 23 MG/DL (ref 6–20)
CALCIUM SERPL-MCNC: 10.4 MG/DL (ref 8.6–10)
CHLORIDE SERPL-SCNC: 101 MMOL/L (ref 98–107)
CHOLEST SERPL-MCNC: 269 MG/DL
CREAT SERPL-MCNC: 0.83 MG/DL (ref 0.51–0.95)
CREAT UR-MCNC: 67.4 MG/DL
DEPRECATED HCO3 PLAS-SCNC: 30 MMOL/L (ref 22–29)
EGFRCR SERPLBLD CKD-EPI 2021: 83 ML/MIN/1.73M2
FASTING STATUS PATIENT QL REPORTED: ABNORMAL
GLUCOSE SERPL-MCNC: 100 MG/DL (ref 70–99)
HBA1C MFR BLD: 6.1 % (ref 4–6.2)
HDLC SERPL-MCNC: 46 MG/DL
LDLC SERPL CALC-MCNC: 180 MG/DL
MICROALBUMIN UR-MCNC: <12 MG/L
MICROALBUMIN/CREAT UR: NORMAL MG/G{CREAT}
NONHDLC SERPL-MCNC: 223 MG/DL
POTASSIUM SERPL-SCNC: 3.8 MMOL/L (ref 3.4–5.3)
SODIUM SERPL-SCNC: 141 MMOL/L (ref 135–145)
TRIGL SERPL-MCNC: 213 MG/DL

## 2024-03-21 PROCEDURE — 99396 PREV VISIT EST AGE 40-64: CPT | Mod: 25 | Performed by: PHYSICIAN ASSISTANT

## 2024-03-21 PROCEDURE — G0123 SCREEN CERV/VAG THIN LAYER: HCPCS | Performed by: PHYSICIAN ASSISTANT

## 2024-03-21 PROCEDURE — 87624 HPV HI-RISK TYP POOLED RSLT: CPT | Mod: ZL | Performed by: PHYSICIAN ASSISTANT

## 2024-03-21 PROCEDURE — 80061 LIPID PANEL: CPT | Mod: ZL | Performed by: PHYSICIAN ASSISTANT

## 2024-03-21 PROCEDURE — 83036 HEMOGLOBIN GLYCOSYLATED A1C: CPT | Mod: ZL | Performed by: PHYSICIAN ASSISTANT

## 2024-03-21 PROCEDURE — 99207 PR FOOT EXAM NO CHARGE: CPT | Performed by: PHYSICIAN ASSISTANT

## 2024-03-21 PROCEDURE — 36415 COLL VENOUS BLD VENIPUNCTURE: CPT | Mod: ZL | Performed by: PHYSICIAN ASSISTANT

## 2024-03-21 PROCEDURE — 82570 ASSAY OF URINE CREATININE: CPT | Mod: ZL | Performed by: PHYSICIAN ASSISTANT

## 2024-03-21 PROCEDURE — 80048 BASIC METABOLIC PNL TOTAL CA: CPT | Mod: ZL | Performed by: PHYSICIAN ASSISTANT

## 2024-03-21 RX ORDER — LISINOPRIL AND HYDROCHLOROTHIAZIDE 20; 25 MG/1; MG/1
1 TABLET ORAL DAILY
Qty: 90 TABLET | Refills: 4 | Status: SHIPPED | OUTPATIENT
Start: 2024-03-21

## 2024-03-21 RX ORDER — SIMVASTATIN 5 MG
5 TABLET ORAL AT BEDTIME
Qty: 90 TABLET | Refills: 3 | Status: SHIPPED | OUTPATIENT
Start: 2024-03-21

## 2024-03-21 SDOH — HEALTH STABILITY: PHYSICAL HEALTH: ON AVERAGE, HOW MANY DAYS PER WEEK DO YOU ENGAGE IN MODERATE TO STRENUOUS EXERCISE (LIKE A BRISK WALK)?: 3 DAYS

## 2024-03-21 SDOH — HEALTH STABILITY: PHYSICAL HEALTH: ON AVERAGE, HOW MANY MINUTES DO YOU ENGAGE IN EXERCISE AT THIS LEVEL?: 30 MIN

## 2024-03-21 ASSESSMENT — SOCIAL DETERMINANTS OF HEALTH (SDOH): HOW OFTEN DO YOU GET TOGETHER WITH FRIENDS OR RELATIVES?: ONCE A WEEK

## 2024-03-21 ASSESSMENT — PAIN SCALES - GENERAL: PAINLEVEL: NO PAIN (0)

## 2024-03-21 NOTE — PATIENT INSTRUCTIONS
Preventive Care Advice   This is general advice given by our system to help you stay healthy. However, your care team may have specific advice just for you. Please talk to your care team about your preventive care needs.  Nutrition  Eat 5 or more servings of fruits and vegetables each day.  Try wheat bread, brown rice and whole grain pasta (instead of white bread, rice, and pasta).  Get enough calcium and vitamin D. Check the label on foods and aim for 100% of the RDA (recommended daily allowance).  Lifestyle  Exercise at least 150 minutes each week   (30 minutes a day, 5 days a week).  Do muscle strengthening activities 2 days a week. These help control your weight and prevent disease.  No smoking.  Wear sunscreen to prevent skin cancer.  Have a dental exam and cleaning every 6 months.  Yearly exams  See your health care team every year to talk about:  Any changes in your health.  Any medicines your care team has prescribed.  Preventive care, family planning, and ways to prevent chronic diseases.  Shots (vaccines)   HPV shots (up to age 26), if you've never had them before.  Hepatitis B shots (up to age 59), if you've never had them before.  COVID-19 shot: Get this shot when it's due.  Flu shot: Get a flu shot every year.  Tetanus shot: Get a tetanus shot every 10 years.  Pneumococcal, hepatitis A, and RSV shots: Ask your care team if you need these based on your risk.  Shingles shot (for age 50 and up).  General health tests  Diabetes screening:  Starting at age 35, Get screened for diabetes at least every 3 years.  If you are younger than age 35, ask your care team if you should be screened for diabetes.  Cholesterol test: At age 39, start having a cholesterol test every 5 years, or more often if advised.  Bone density scan (DEXA): At age 50, ask your care team if you should have this scan for osteoporosis (brittle bones).  Hepatitis C: Get tested at least once in your life.  STIs (sexually transmitted  infections)  Before age 24: Ask your care team if you should be screened for STIs.  After age 24: Get screened for STIs if you're at risk. You are at risk for STIs (including HIV) if:  You are sexually active with more than one person.  You don't use condoms every time.  You or a partner was diagnosed with a sexually transmitted infection.  If you are at risk for HIV, ask about PrEP medicine to prevent HIV.  Get tested for HIV at least once in your life, whether you are at risk for HIV or not.  Cancer screening tests  Cervical cancer screening: If you have a cervix, begin getting regular cervical cancer screening tests at age 21. Most people who have regular screenings with normal results can stop after age 65. Talk about this with your provider.  Breast cancer scan (mammogram): If you've ever had breasts, begin having regular mammograms starting at age 40. This is a scan to check for breast cancer.  Colon cancer screening: It is important to start screening for colon cancer at age 45.  Have a colonoscopy test every 10 years (or more often if you're at risk) Or, ask your provider about stool tests like a FIT test every year or Cologuard test every 3 years.  To learn more about your testing options, visit: https://www.[employer]/198596.pdf.  For help making a decision, visit: https://bit.ly/dh59037.  Prostate cancer screening test: If you have a prostate and are age 55 to 69, ask your provider if you would benefit from a yearly prostate cancer screening test.  Lung cancer screening: If you are a current or former smoker age 50 to 80, ask your care team if ongoing lung cancer screenings are right for you.  For informational purposes only. Not to replace the advice of your health care provider. Copyright   2023 [employer]. All rights reserved. Clinically reviewed by the [facility]. [employer] 681231 - REV 01/24.    Eating Healthy Foods: Care Instructions  With every meal, you  "can make healthy food choices. Try to eat a variety of fruits, vegetables, whole grains, lean proteins, and low-fat dairy products. This can help you get the right balance of nutrients, including vitamins and minerals. Small changes add up over time. You can start by adding one healthy food to your meals each day.    Try to make half your plate fruits and vegetables, one-fourth whole grains, and one-fourth lean proteins. Try including dairy with your meals.   Eat more fruits and vegetables. Try to have them with most meals and snacks.   Foods for healthy eating    Fruits    These can be fresh, frozen, canned, or dried.  Try to choose whole fruit rather than fruit juice.  Eat a variety of colors.    Vegetables    These can be fresh, frozen, canned, or dried.  Beans, peas, and lentils count too.    Whole grains    Choose whole-grain breads, cereals, and noodles.  Try brown rice.    Lean proteins    These can include lean meat, poultry, fish, and eggs.  You can also have tofu, beans, peas, lentils, nuts, and seeds.    Dairy    Try milk, yogurt, and cheese.  Choose low-fat or fat-free when you can.  If you need to, use lactose-free milk or fortified plant-based milk products, such as soy milk.    Water    Drink water when you're thirsty.  Limit sugar-sweetened drinks, including soda, fruit drinks, and sports drinks.  Where can you learn more?  Go to https://www.[employer].net/patiented  Enter T756 in the search box to learn more about \"Eating Healthy Foods: Care Instructions.\"  Current as of: September 20, 2023               Content Version: 14.0    3842-9072 [employer].   Care instructions adapted under license by your healthcare professional. If you have questions about a medical condition or this instruction, always ask your healthcare professional. [employer] disclaims any warranty or liability for your use of this information.      Learning About Stress  What is stress?     Stress is " your body's response to a hard situation. Your body can have a physical, emotional, or mental response. Stress is a fact of life for most people, and it affects everyone differently. What causes stress for you may not be stressful for someone else.  A lot of things can cause stress. You may feel stress when you go on a job interview, take a test, or run a race. This kind of short-term stress is normal and even useful. It can help you if you need to work hard or react quickly. For example, stress can help you finish an important job on time.  Long-term stress is caused by ongoing stressful situations or events. Examples of long-term stress include long-term health problems, ongoing problems at work, or conflicts in your family. Long-term stress can harm your health.  How does stress affect your health?  When you are stressed, your body responds as though you are in danger. It makes hormones that speed up your heart, make you breathe faster, and give you a burst of energy. This is called the fight-or-flight stress response. If the stress is over quickly, your body goes back to normal and no harm is done.  But if stress happens too often or lasts too long, it can have bad effects. Long-term stress can make you more likely to get sick, and it can make symptoms of some diseases worse. If you tense up when you are stressed, you may develop neck, shoulder, or low back pain. Stress is linked to high blood pressure and heart disease.  Stress also harms your emotional health. It can make you chaparro, tense, or depressed. Your relationships may suffer, and you may not do well at work or school.  What can you do to manage stress?  You can try these things to help manage stress:   Do something active. Exercise or activity can help reduce stress. Walking is a great way to get started. Even everyday activities such as housecleaning or yard work can help.  Try yoga or leah chi. These techniques combine exercise and meditation. You may  need some training at first to learn them.  Do something you enjoy. For example, listen to music or go to a movie. Practice your hobby or do volunteer work.  Meditate. This can help you relax, because you are not worrying about what happened before or what may happen in the future.  Do guided imagery. Imagine yourself in any setting that helps you feel calm. You can use online videos, books, or a teacher to guide you.  Do breathing exercises. For example:  From a standing position, bend forward from the waist with your knees slightly bent. Let your arms dangle close to the floor.  Breathe in slowly and deeply as you return to a standing position. Roll up slowly and lift your head last.  Hold your breath for just a few seconds in the standing position.  Breathe out slowly and bend forward from the waist.  Let your feelings out. Talk, laugh, cry, and express anger when you need to. Talking with supportive friends or family, a counselor, or a mu leader about your feelings is a healthy way to relieve stress. Avoid discussing your feelings with people who make you feel worse.  Write. It may help to write about things that are bothering you. This helps you find out how much stress you feel and what is causing it. When you know this, you can find better ways to cope.  What can you do to prevent stress?  You might try some of these things to help prevent stress:  Manage your time. This helps you find time to do the things you want and need to do.  Get enough sleep. Your body recovers from the stresses of the day while you are sleeping.  Get support. Your family, friends, and community can make a difference in how you experience stress.  Limit your news feed. Avoid or limit time on social media or news that may make you feel stressed.  Do something active. Exercise or activity can help reduce stress. Walking is a great way to get started.  Where can you learn more?  Go to https://www.healthwise.net/patiented  Enter N032  "in the search box to learn more about \"Learning About Stress.\"  Current as of: October 24, 2023               Content Version: 14.0    2208-0377 [employer].   Care instructions adapted under license by your healthcare professional. If you have questions about a medical condition or this instruction, always ask your healthcare professional. [employer] disclaims any warranty or liability for your use of this information.      "

## 2024-03-21 NOTE — NURSING NOTE
Patient is here for physical.      Patient's last menstrual period was 01/01/2019 (within months).  Medication Reconciliation: complete    Es Francis LPN 3/21/2024 3:07 PM       Advance care directive on file? no  Advance care directive provided to patient? yes       Es Francis LPN

## 2024-03-21 NOTE — PROGRESS NOTES
Preventive Care Visit  [facility] AND [facility]  Diana Hernandez PA-C, Family Medicine  Mar 21, 2024      Assessment & Plan   Problem List Items Addressed This Visit          Endocrine    Hyperlipidemia    Relevant Medications    simvastatin (ZOCOR) 5 MG tablet    Other Relevant Orders    Lipid Panel (Completed)    Lipid Panel    Controlled type 2 diabetes mellitus without complication, without long-term current use of insulin (H)    Relevant Medications    metFORMIN (GLUCOPHAGE) 500 MG tablet    Other Relevant Orders    Hemoglobin A1c (Completed)    Albumin Random Urine Quantitative with Creat Ratio (Completed)    Basic Metabolic Panel (Completed)    FOOT EXAM (Completed)       Circulatory    Benign essential hypertension    Relevant Medications    lisinopril-hydrochlorothiazide (ZESTORETIC) 20-25 MG tablet    Other Relevant Orders    Basic Metabolic Panel (Completed)     Other Visit Diagnoses       Routine general medical examination at a health care facility    -  Primary    Pap test, as part of routine gynecological examination        Relevant Orders    Pap Screen with HPV - recommended age 30 - 65 years    HPV High Risk Types DNA Cervical    Serum calcium elevated        Relevant Orders    Ionized Calcium    Basic Metabolic Panel    Parathyroid Hormone Intact           Hypertension: Continue medications as previously prescribed.  Currently stable.  No acute concerns at this time.    Type 2 diabetes mellitus: Continue medications as previously prescribed.  Encourage good diet and exercise along with weight loss to help maintain blood sugars.  Recheck in 1 year for monitoring as patient has been stable.    Completed Pap test for cervical cancer screening.    Hyperlipidemia: Discussed options and concerns at length.  Patient is agreeable to starting a different statin medication.  Started on simvastatin.  Patient can slowly start the medication 1 to 2/week and then increase as tolerated.  Encouraged setting  "up a recheck fasting lab only appointment 3 months after maintaining a consistent dose that is tolerated.    Patient serum calcium is mildly elevated.  Will order future repeat calcium level when she sets up her recheck cholesterol lab only appointment.    Patient has an eye appointment in 1 week.  Encouraged to have her send the eye recheck information to the clinic to update her chart.    Declines vaccines.     Repeat physical in 1 year.    Patient has been advised of split billing requirements and indicates understanding: Yes  Ordering of each unique test  Prescription drug management  30 minutes spent by me on the date of the encounter doing chart review, history and exam, documentation and further activities per the note     BMI  Estimated body mass index is 28.07 kg/m  as calculated from the following:    Height as of this encounter: 1.727 m (5' 8\").    Weight as of this encounter: 83.7 kg (184 lb 9.6 oz).   Weight management plan: Discussed healthy diet and exercise guidelines    Counseling  Appropriate preventive services were discussed with this patient, including applicable screening as appropriate for fall prevention, nutrition, physical activity, Tobacco-use cessation, weight loss and cognition.  Checklist reviewing preventive services available has been given to the patient.  Reviewed patient's diet, addressing concerns and/or questions.   She is at risk for lack of exercise and has been provided with information to increase physical activity for the benefit of her well-being.   She is at risk for psychosocial distress and has been provided with information to reduce risk.     See Patient Instructions    Return in about 53 weeks (around 3/27/2025) for Annual Wellness Visit.      Tammy Merchant is a 54 year old, presenting for the following:  Physical        3/21/2024     3:04 PM   Additional Questions   Roomed by Es jordan        Health Care Directive  Patient does not have a Health Care Directive " or Living Will: Discussed advance care planning with patient; information given to patient to review.    History of Present Illness       Hypertension: She presents for follow up of hypertension.  She does not check blood pressure  regularly outside of the clinic. Outpatient blood pressures have not been over 140/90. She does not follow a low salt diet.        Patient's last menstrual period was 2019 (within months).   Contraception: none  Risk for STI?: no  Last pap: 2018 - nl pap and HPV, repeat in 5 years   Due for repeat  Any hx of abnormal paps:  no  FH of early CA?: none  Cholesterol/DM concerns/screening: diabetes in the past  Tobacco?: no  Lung cancer screening: na  Calcium intake: yes  DEXA: na  Last mammo: 2024 scheduled, last one 3/17/2023  Colonoscopy: 2021 - repeat in 5 years  Hepatitis C screen: none, declines  HIV screen: none, declines  Immunizations: declines today     Diabetes:   Blood sugar results:    Problems with medication: none   Symptoms of low blood sugars: do not feel well  Diet and exercise: yes  Blood pressure: stable  Ace or Arb: yes  Foot sores, numbness or tingling: none  Last foot check: 3/21/24  Urinary symptoms: none  Last urine protein screenin, repeated  Taking aspirin daily: yes  Taking cholesterol medication: ok to start  Last eye check: , appt next week  Smoking: no  ER visits with DM: na    Patient has been doing well with her medications.  Tolerates her blood pressure medications well.  No side effects noted.  No side effects noted with the metformin.    Previously was on pravastatin for hyperlipidemia.  Ended up getting muscle aches.  Fine with restarting a different statin to see if she tolerates it better.      The 10-year ASCVD risk score (Ashlee LE, et al., 2019) is: 6.8%    Values used to calculate the score:      Age: 54 years      Sex: Female      Is Non- : No      Diabetic: Yes      Tobacco smoker: No       Systolic Blood Pressure: 116 mmHg      Is BP treated: Yes      HDL Cholesterol: 46 mg/dL      Total Cholesterol: 269 mg/dL              3/21/2024   General Health   How would you rate your overall physical health? Good   Feel stress (tense, anxious, or unable to sleep) Only a little   (!) STRESS CONCERN      3/21/2024   Nutrition   Three or more servings of calcium each day? Yes   Diet: Diabetic   How many servings of fruit and vegetables per day? (!) 2-3   How many sweetened beverages each day? 0-1         3/21/2024   Exercise   Days per week of moderate/strenous exercise 3 days   Average minutes spent exercising at this level 30 min         3/21/2024   Social Factors   Frequency of gathering with friends or relatives Once a week   Worry food won't last until get money to buy more No   Food not last or not have enough money for food? No   Do you have housing?  Yes   Are you worried about losing your housing? No   Lack of transportation? No   Unable to get utilities (heat,electricity)? No          No data to display                   3/21/2024   Dental   Dentist two times every year? Yes         3/21/2024   TB Screening   Were you born outside of the [place]? No         Today's PHQ-2 Score:       3/21/2024     2:55 PM   PHQ-2 ( 1999 [employer])   Q1: Little interest or pleasure in doing things 0   Q2: Feeling down, depressed or hopeless 0   PHQ-2 Score 0   Q1: Little interest or pleasure in doing things Not at all   Q2: Feeling down, depressed or hopeless Not at all   PHQ-2 Score 0           3/21/2024   Substance Use   Alcohol more than 3/day or more than 7/wk No   Do you use any other substances recreationally? No     Social History     Tobacco Use    Smoking status: Never     Passive exposure: Never    Smokeless tobacco: Never   Vaping Use    Vaping Use: Never used   Substance Use Topics    Alcohol use: Not Currently     Comment: 1 time every couple of months    Drug use: No             3/21/2024   Breast Cancer Screening    Family history of breast, colon, or ovarian cancer? Yes         3/21/2024   LAST FHS-7 RESULTS   1st degree relative breast or ovarian cancer No   Any relative bilateral breast cancer No   Any male have breast cancer No   Any ONE woman have BOTH breast AND ovarian cancer No   Any woman with breast cancer before 50yrs No   2 or more relatives with breast AND/OR ovarian cancer No   2 or more relatives with breast AND/OR bowel cancer Yes        Mammogram Screening - Mammogram every 1-2 years updated in Health Maintenance based on mutual decision making        3/21/2024   STI Screening   New sexual partner(s) since last STI/HIV test? No     History of abnormal Pap smear: Last 3 Pap and HPV Results:       Latest Ref Rng & Units 4/24/2018     7:56 AM   PAP / HPV   HPV 16 DNA NEG^Negative Negative    HPV 18 DNA NEG^Negative Negative    Other HR HPV NEG^Negative Negative            Latest Ref Rng & Units 4/24/2018     7:56 AM   PAP / HPV   HPV 16 DNA NEG^Negative Negative    HPV 18 DNA NEG^Negative Negative    Other HR HPV NEG^Negative Negative      ASCVD Risk   The 10-year ASCVD risk score (Ashlee LE, et al., 2019) is: 6.8%    Values used to calculate the score:      Age: 54 years      Sex: Female      Is Non- : No      Diabetic: Yes      Tobacco smoker: No      Systolic Blood Pressure: 116 mmHg      Is BP treated: Yes      HDL Cholesterol: 46 mg/dL      Total Cholesterol: 269 mg/dL           Reviewed and updated as needed this visit by Provider   Tobacco  Allergies  Meds  Problems  Med Hx  Surg Hx  Fam Hx            Past Medical History:   Diagnosis Date    Personal history of other medical treatment (CODE)     Multiparity, Normal vaginal delivery x2    Personal history of other medical treatment (CODE)     No Comments Provided     Past Surgical History:   Procedure Laterality Date    COLONOSCOPY      03/2016,TRUDI --colonoscopy program  [facility]--recommend 3 yeaqr followup     COLONOSCOPY  2021    [facility], repeat in 5 years    TONSILLECTOMY      Age 5     Labs reviewed in EPIC  BP Readings from Last 3 Encounters:   24 116/84   10/05/23 110/62   23 118/60    Wt Readings from Last 3 Encounters:   24 83.7 kg (184 lb 9.6 oz)   10/05/23 83 kg (183 lb)   23 81.6 kg (180 lb)                  Patient Active Problem List   Diagnosis    Hyperlipidemia    Plantar fascial fibromatosis    Thrombophlebitis of superficial veins of left lower extremity    Controlled type 2 diabetes mellitus without complication, without long-term current use of insulin (H)    Benign essential hypertension     Past Surgical History:   Procedure Laterality Date    COLONOSCOPY      2016,TRUDI --colonoscopy program  [facility]--recommend 3 yeaqr followup    COLONOSCOPY  2021    [facility], repeat in 5 years    TONSILLECTOMY      Age 5       Social History     Tobacco Use    Smoking status: Never     Passive exposure: Never    Smokeless tobacco: Never   Substance Use Topics    Alcohol use: Not Currently     Comment: 1 time every couple of months     Family History   Problem Relation Age of Onset    Heart Disease Mother 48        Heart Disease,CABG    Colon Cancer Father         Cancer-colon, of colon cancer at age 58    Heart Disease Brother     Heart Disease Maternal Grandmother         Heart Disease,CABG    Other - See Comments Paternal Grandmother          at age 93 of natural causes.    Colon Cancer Paternal Grandmother         Cancer-colon    Heart Disease Maternal Aunt         Heart Disease, who have had bypass surgeries and stents    Heart Disease Maternal Uncle         Heart Disease, who have had bypass surgeries and stents    Breast Cancer No family hx of         Cancer-breast         Current Outpatient Medications   Medication Sig Dispense Refill    aspirin (ASA) 81 MG EC tablet Take 1 tablet (81 mg) by mouth daily      lisinopril-hydrochlorothiazide (ZESTORETIC) 20-25 MG tablet  "Take 1 tablet by mouth daily 90 tablet 4    metFORMIN (GLUCOPHAGE) 500 MG tablet Take 1 tablet (500 mg) by mouth 2 times daily (with meals) 180 tablet 4    simvastatin (ZOCOR) 5 MG tablet Take 1 tablet (5 mg) by mouth at bedtime 90 tablet 3    vitamin B complex with vitamin C (VITAMIN  B COMPLEX) tablet Take 1 tablet by mouth daily      vitamin D3 (CHOLECALCIFEROL) 50 mcg (2000 units) tablet Take 1 tablet (50 mcg) by mouth daily       Allergies   Allergen Reactions    Pravastatin Muscle Pain (Myalgia)     Recent Labs   Lab Test 03/21/24  1605 10/05/23  1513 01/27/23  0944 08/04/22  1530 03/11/22  1152 12/03/21  0917 04/23/21  0945 04/13/21  1825 08/05/19  0940 10/09/18  0925 10/09/18  0900   A1C 6.1 6.4* 6.3* 6.2   < > 6.1   < >  --  6.3*   < >  --    *  --   --  95  --  130*  --   --   --    < >  --    HDL 46*  --   --  37  --  37  --   --   --    < >  --    TRIG 213*  --   --  221*  --  220*  --   --   --    < >  --    ALT  --   --   --   --   --   --   --  23  --   --  12   CR 0.83 0.83  --  0.86   < >  --   --  1.06 0.75  --  0.81   GFRESTIMATED 83 83  --  80   < >  --   --  55* 82  --  75   GFRESTBLACK  --   --   --   --   --   --   --  66 >90  --  >90   POTASSIUM 3.8 4.0  --  3.8  --   --   --  3.8 3.9  --  4.2    < > = values in this interval not displayed.          Review of Systems  Constitutional, neuro, ENT, endocrine, pulmonary, cardiac, gastrointestinal, genitourinary, musculoskeletal, integument and psychiatric systems are negative, except as otherwise noted.     Objective    Exam  /84   Pulse 97   Temp 97.3  F (36.3  C) (Tympanic)   Resp 18   Ht 1.727 m (5' 8\")   Wt 83.7 kg (184 lb 9.6 oz)   LMP 01/01/2019 (Within Months)   SpO2 98%   Breastfeeding No   BMI 28.07 kg/m     Estimated body mass index is 28.07 kg/m  as calculated from the following:    Height as of this encounter: 1.727 m (5' 8\").    Weight as of this encounter: 83.7 kg (184 lb 9.6 oz).    Physical Exam  GENERAL: " alert and no distress  EYES: Eyes grossly normal to inspection, PERRL and conjunctivae and sclerae normal  HENT: ear canals and TM's normal, nose and mouth without ulcers or lesions  NECK: no adenopathy, no asymmetry, masses, or scars  RESP: lungs clear to auscultation - no rales, rhonchi or wheezes  CV: regular rate and rhythm, normal S1 S2, no S3 or S4, no murmur, click or rub, no peripheral edema  ABDOMEN: soft, nontender, no hepatosplenomegaly, no masses and bowel sounds normal   (female) w/bimanual: normal female external genitalia, normal urethral meatus, normal vaginal mucosa, and normal cervix/adnexa/uterus without masses or discharge  Pap completed: yes  MS: no gross musculoskeletal defects noted, no edema  SKIN: no suspicious lesions or rashes  NEURO: Normal strength and tone, mentation intact and speech normal  PSYCH: mentation appears normal, affect normal/bright        Signed Electronically by: Diana Hernandez PA-C

## 2024-03-26 LAB
BKR LAB AP GYN ADEQUACY: NORMAL
BKR LAB AP GYN INTERPRETATION: NORMAL
BKR LAB AP HPV REFLEX: NORMAL
BKR LAB AP PREVIOUS ABNORMAL: NORMAL
PATH REPORT.COMMENTS IMP SPEC: NORMAL
PATH REPORT.COMMENTS IMP SPEC: NORMAL
PATH REPORT.RELEVANT HX SPEC: NORMAL

## 2024-03-28 LAB
HUMAN PAPILLOMA VIRUS 16 DNA: NEGATIVE
HUMAN PAPILLOMA VIRUS 18 DNA: NEGATIVE
HUMAN PAPILLOMA VIRUS FINAL DIAGNOSIS: NORMAL
HUMAN PAPILLOMA VIRUS OTHER HR: NEGATIVE

## 2024-04-02 DIAGNOSIS — E78.2 MIXED HYPERLIPIDEMIA: Primary | ICD-10-CM

## 2024-06-20 ENCOUNTER — HOSPITAL ENCOUNTER (OUTPATIENT)
Dept: ULTRASOUND IMAGING | Facility: OTHER | Age: 54
Discharge: HOME | End: 2024-06-20
Attending: PHYSICIAN ASSISTANT
Payer: COMMERCIAL

## 2024-06-20 ENCOUNTER — HOSPITAL ENCOUNTER (OUTPATIENT)
Dept: MAMMOGRAPHY | Facility: OTHER | Age: 54
Discharge: HOME | End: 2024-06-20
Attending: PHYSICIAN ASSISTANT
Payer: COMMERCIAL

## 2024-06-20 DIAGNOSIS — R92.8 ABNORMAL FINDING ON BREAST IMAGING: ICD-10-CM

## 2024-06-20 DIAGNOSIS — Z12.31 VISIT FOR SCREENING MAMMOGRAM: ICD-10-CM

## 2024-06-20 PROCEDURE — 77063 BREAST TOMOSYNTHESIS BI: CPT

## 2024-06-20 PROCEDURE — 76642 ULTRASOUND BREAST LIMITED: CPT | Mod: LT

## 2024-06-20 PROCEDURE — 77061 BREAST TOMOSYNTHESIS UNI: CPT | Mod: GG,LT

## 2024-12-19 ENCOUNTER — HOSPITAL ENCOUNTER (OUTPATIENT)
Dept: ULTRASOUND IMAGING | Facility: OTHER | Age: 54
Discharge: HOME | End: 2024-12-19
Attending: PHYSICIAN ASSISTANT
Payer: COMMERCIAL

## 2024-12-19 DIAGNOSIS — R92.8 ABNORMAL FINDING ON BREAST IMAGING: ICD-10-CM

## 2024-12-19 DIAGNOSIS — Z09 FOLLOW-UP EXAM, 3-6 MONTHS SINCE PREVIOUS EXAM: ICD-10-CM

## 2024-12-19 PROCEDURE — 76642 ULTRASOUND BREAST LIMITED: CPT | Mod: LT

## 2025-03-13 ENCOUNTER — NURSE TRIAGE (OUTPATIENT)
Dept: FAMILY MEDICINE | Facility: OTHER | Age: 55
End: 2025-03-13

## 2025-03-13 NOTE — TELEPHONE ENCOUNTER
Message left for patient to return call. Provider requested triage for today's appointment. Beatrice Verdin RN on 3/13/2025 at 1:18 PM Extension 3/13/25

## 2025-03-13 NOTE — TELEPHONE ENCOUNTER
"Dr. Richter,   Below are patient's concerns regarding her appointment with you this afternoon.    Patient states she has not had lab work done for about a year and she would like to get a full panel including urine. She suspects that she is on too high a dose of metformin and lisinopril-hydrochlorothiazide. Patient states she sometimes gets dizzy when her blood pressure is low. She also thinks her blood glucose runs too low. Patient states she has a good diet, but a few hours after eating she gets dizzy, feels off, and feels like she has to eat again to get back to baseline. Patient reports glucose level is usually around 110 two hours after eating. \"And while that may sound ideal, maybe it's not ideal for me.\"      She would also like if you could examine her varicose and spider veins as they have become many and painful.     Beatrice Verdin RN on 3/13/2025 at 1:36 PM Extension [id number]      "

## 2025-04-04 DIAGNOSIS — I10 BENIGN ESSENTIAL HYPERTENSION: ICD-10-CM

## 2025-04-05 ENCOUNTER — HEALTH MAINTENANCE LETTER (OUTPATIENT)
Age: 55
End: 2025-04-05

## 2025-04-09 RX ORDER — LISINOPRIL AND HYDROCHLOROTHIAZIDE 20; 25 MG/1; MG/1
1 TABLET ORAL DAILY
Qty: 90 TABLET | Refills: 0 | Status: SHIPPED | OUTPATIENT
Start: 2025-04-09

## 2025-04-09 NOTE — TELEPHONE ENCOUNTER
[facility] sent Rx request for the following:      Requested Prescriptions   Pending Prescriptions Disp Refills    lisinopril-hydrochlorothiazide (ZESTORETIC) 20-25 MG tablet [Pharmacy Med Name: Lisinopril-hydroCHLOROthiazide 20-25 MG Oral Tablet] 90 tablet 0     Sig: Take 1 tablet by mouth once daily       Diuretics (Including Combos) Protocol Failed - 4/9/2025  1:20 PM        Failed - Most recent blood pressure under 140/90 in past 12 months     BP Readings from Last 3 Encounters:   03/21/24 116/84   10/05/23 110/62   04/14/23 118/60       No data recorded            Failed - Potassium level on file in past 12 months        Failed - Has GFR on file in past 12 months and most recent value is normal       ACE Inhibitors (Including Combos) Protocol Failed - 4/9/2025  1:20 PM        Failed - Most recent blood pressure under 140/90 in past 12 months- Clinicial or Patient Reported     BP Readings from Last 3 Encounters:   03/21/24 116/84   10/05/23 110/62   04/14/23 118/60       No data recorded            Failed - Most recent GFR on file in the past 12 months >30        Failed - Normal serum potassium on file in past 12 months     Recent Labs   Lab Test 03/21/24  1605   POTASSIUM 3.8            Last Prescription Date:   3/21/24  Last Fill Qty/Refills:         90, R-4    Last Office Visit:              3/21/24   Future Office visit:             Next 5 appointments (look out 90 days)      Apr 11, 2025 10:45 AM  (Arrive by 10:30 AM)  Adult Preventative Visit with Luma Rees MD  [facility] ([facility]) [address]  [place] [id number] [phone]

## 2025-06-04 ENCOUNTER — OFFICE VISIT (OUTPATIENT)
Dept: FAMILY MEDICINE | Facility: OTHER | Age: 55
End: 2025-06-04
Attending: PHYSICIAN ASSISTANT
Payer: COMMERCIAL

## 2025-06-04 VITALS
BODY MASS INDEX: 27.83 KG/M2 | SYSTOLIC BLOOD PRESSURE: 132 MMHG | DIASTOLIC BLOOD PRESSURE: 76 MMHG | WEIGHT: 184.4 LBS | OXYGEN SATURATION: 96 % | HEART RATE: 101 BPM | TEMPERATURE: 97.5 F

## 2025-06-04 DIAGNOSIS — N95.0 POSTMENOPAUSAL BLEEDING: Primary | ICD-10-CM

## 2025-06-04 LAB
BACTERIAL VAGINOSIS VAG-IMP: NEGATIVE
CANDIDA DNA VAG QL NAA+PROBE: NOT DETECTED
CANDIDA GLABRATA / CANDIDA KRUSEI DNA: NOT DETECTED
T VAGINALIS DNA VAG QL NAA+PROBE: NOT DETECTED

## 2025-06-04 PROCEDURE — 81515 NFCT DS BV&VAGINITIS DNA ALG: CPT | Mod: ZL | Performed by: PHYSICIAN ASSISTANT

## 2025-06-04 NOTE — NURSING NOTE
"Chief Complaint   Patient presents with    Vaginal Bleeding     Patient here because she has been having spotting on and off for a few weeks. She has not had a period for over 4 yrs. She barely had any menopause symptoms back then.   Initial /76   Pulse 101   Temp 97.5  F (36.4  C) (Tympanic)   Wt 83.6 kg (184 lb 6.4 oz)   LMP 01/01/2019 (Within Months)   SpO2 96%   BMI 27.83 kg/m   Estimated body mass index is 27.83 kg/m  as calculated from the following:    Height as of 4/11/25: 1.734 m (5' 8.25\").    Weight as of this encounter: 83.6 kg (184 lb 6.4 oz).  Medication Review: complete    The next two questions are to help us understand your food security.  If you are feeling you need any assistance in this area, we have resources available to support you today.          4/10/2025   SDOH- Food Insecurity   Within the past 12 months, did you worry that your food would run out before you got money to buy more? N   Within the past 12 months, did the food you bought just not last and you didn t have money to get more? N         Health Care Directive:  Patient does not have a Health Care Directive: Discussed advance care planning with patient; however, patient declined at this time.    Rakel Avery MA      "

## 2025-06-04 NOTE — PROGRESS NOTES
"  Assessment & Plan   Problem List Items Addressed This Visit    None  Visit Diagnoses         Postmenopausal bleeding    -  Primary    Relevant Orders    US Pelvic Complete with Transvaginal    Ob/Gyn  Referral    Multiplex Vaginal Panel by PCR           Postmenopausal bleeding: Completed vaginal wet prep to rule out bacterial infection concerns.  Lab result is pending.  Ordered pelvic ultrasound along with an OB/GYN consult to further discuss the symptoms along with workup/treatment recommendations.    The longitudinal plan of care for the diagnosis(es)/condition(s) as documented were addressed during this visit. Due to the added complexity in care, I will continue to support Shonda in the subsequent management and with ongoing continuity of care.     BMI  Estimated body mass index is 27.83 kg/m  as calculated from the following:    Height as of 4/11/25: 1.734 m (5' 8.25\").    Weight as of this encounter: 83.6 kg (184 lb 6.4 oz).           Subjective   Shonda is a 55 year old, presenting for the following health issues:  Vaginal Bleeding        6/4/2025     3:51 PM   Additional Questions   Roomed by Rakel WICK CMA     History of Present Illness       Reason for visit:  Female prevention  Symptoms include:  Pink discharge every few days  Symptom intensity:  Mild  Symptom progression:  Staying the same  Had these symptoms before:  No   She is taking medications regularly.        Patient is here because she has been having spotting on and off for a few weeks. She has not had a period for over 4 yrs. She barely had any menopause symptoms back then.     Pap and HPV normal 3/21/2024    Patient is had vaginal bleeding every other day.  Has some vaginal bleeding with wiping after urination.  Has noticed the symptoms every few days.  Started approximately 3 to 4 weeks ago.  No vaginal itching or discharge.  No STD concerns.  No rashes.  No dysuria, urgency.  Pees often with her water pill.  No blood in the urine. " " Notices blood with wiping.  Has been more vaginal dryness over the last year.  Sometimes feels like her uterus is \"tilted\".      Review of Systems  Constitutional, HEENT, cardiovascular, pulmonary, gi and gu systems are negative, except as otherwise noted.      Objective    /76   Pulse 101   Temp 97.5  F (36.4  C) (Tympanic)   Wt 83.6 kg (184 lb 6.4 oz)   LMP 01/01/2019 (Within Months)   SpO2 96%   BMI 27.83 kg/m    Body mass index is 27.83 kg/m .  Physical Exam  Vitals and nursing note reviewed.   Constitutional:       General: She is not in acute distress.     Appearance: Normal appearance. She is well-developed.   Cardiovascular:      Rate and Rhythm: Normal rate and regular rhythm.      Heart sounds: Normal heart sounds, S1 normal and S2 normal.   Pulmonary:      Effort: Pulmonary effort is normal. No respiratory distress.      Breath sounds: Normal breath sounds. No wheezing or rales.   Abdominal:      General: Abdomen is flat. Bowel sounds are normal.      Palpations: Abdomen is soft. There is no mass.      Tenderness: There is no abdominal tenderness. There is no right CVA tenderness, left CVA tenderness, guarding or rebound.   Genitourinary:     General: Normal vulva.      Labia:         Right: No rash or tenderness.         Left: No rash or tenderness.       Vagina: No vaginal discharge.      Cervix: No cervical motion tenderness or discharge.   Musculoskeletal:         General: Normal range of motion.      Cervical back: Normal range of motion.   Skin:     General: Skin is warm and dry.      Findings: No rash.   Neurological:      Mental Status: She is alert and oriented to person, place, and time.      Motor: No abnormal muscle tone.      Deep Tendon Reflexes: Reflexes are normal and symmetric.   Psychiatric:         Mood and Affect: Mood normal.         Behavior: Behavior normal.            No results found for any visits on 06/04/25.        Signed Electronically by: Diana Hernandez PA-C    "

## 2025-06-06 ENCOUNTER — RESULTS FOLLOW-UP (OUTPATIENT)
Dept: FAMILY MEDICINE | Facility: OTHER | Age: 55
End: 2025-06-06

## 2025-06-12 ENCOUNTER — HOSPITAL ENCOUNTER (OUTPATIENT)
Dept: ULTRASOUND IMAGING | Facility: OTHER | Age: 55
End: 2025-06-12
Attending: PHYSICIAN ASSISTANT
Payer: COMMERCIAL

## 2025-06-12 DIAGNOSIS — N95.0 POSTMENOPAUSAL BLEEDING: ICD-10-CM

## 2025-06-12 PROCEDURE — 76856 US EXAM PELVIC COMPLETE: CPT

## 2025-06-18 ENCOUNTER — RESULTS FOLLOW-UP (OUTPATIENT)
Dept: OBGYN | Facility: OTHER | Age: 55
End: 2025-06-18
Payer: COMMERCIAL

## 2025-06-18 DIAGNOSIS — N93.9 ABNORMAL UTERINE BLEEDING DUE TO ENDOMETRIAL POLYP: Primary | ICD-10-CM

## 2025-06-18 DIAGNOSIS — N84.0 ABNORMAL UTERINE BLEEDING DUE TO ENDOMETRIAL POLYP: Primary | ICD-10-CM

## 2025-06-18 NOTE — TELEPHONE ENCOUNTER
After verifying patients name and date of birth with pt, pt was notified of message. States she will call Monday to scheduled a follow up.  Rosalee Gómez RN

## 2025-07-02 ENCOUNTER — OFFICE VISIT (OUTPATIENT)
Dept: FAMILY MEDICINE | Facility: OTHER | Age: 55
End: 2025-07-02
Attending: FAMILY MEDICINE
Payer: COMMERCIAL

## 2025-07-02 ENCOUNTER — RESULTS FOLLOW-UP (OUTPATIENT)
Dept: OBGYN | Facility: OTHER | Age: 55
End: 2025-07-02

## 2025-07-02 ENCOUNTER — OFFICE VISIT (OUTPATIENT)
Dept: OBGYN | Facility: OTHER | Age: 55
End: 2025-07-02
Payer: COMMERCIAL

## 2025-07-02 VITALS
DIASTOLIC BLOOD PRESSURE: 82 MMHG | BODY MASS INDEX: 27.21 KG/M2 | HEART RATE: 77 BPM | WEIGHT: 183.7 LBS | SYSTOLIC BLOOD PRESSURE: 128 MMHG | OXYGEN SATURATION: 96 % | RESPIRATION RATE: 16 BRPM | HEIGHT: 69 IN

## 2025-07-02 VITALS
WEIGHT: 184 LBS | HEIGHT: 69 IN | HEART RATE: 71 BPM | DIASTOLIC BLOOD PRESSURE: 76 MMHG | BODY MASS INDEX: 27.25 KG/M2 | TEMPERATURE: 97.8 F | SYSTOLIC BLOOD PRESSURE: 123 MMHG | OXYGEN SATURATION: 98 % | RESPIRATION RATE: 18 BRPM

## 2025-07-02 DIAGNOSIS — N93.9 VAGINAL BLEEDING: ICD-10-CM

## 2025-07-02 DIAGNOSIS — N84.0 ABNORMAL UTERINE BLEEDING DUE TO ENDOMETRIAL POLYP: ICD-10-CM

## 2025-07-02 DIAGNOSIS — R35.0 URINE FREQUENCY: Primary | ICD-10-CM

## 2025-07-02 DIAGNOSIS — R35.0 URINARY FREQUENCY: Primary | ICD-10-CM

## 2025-07-02 DIAGNOSIS — R10.9 ABDOMINAL CRAMPING: ICD-10-CM

## 2025-07-02 DIAGNOSIS — N93.9 ABNORMAL UTERINE BLEEDING DUE TO ENDOMETRIAL POLYP: ICD-10-CM

## 2025-07-02 LAB
ALBUMIN UR-MCNC: NEGATIVE MG/DL
APPEARANCE UR: CLEAR
BACTERIA #/AREA URNS HPF: ABNORMAL /HPF
BACTERIAL VAGINOSIS VAG-IMP: NEGATIVE
BILIRUB UR QL STRIP: NEGATIVE
CANDIDA DNA VAG QL NAA+PROBE: NOT DETECTED
CANDIDA GLABRATA / CANDIDA KRUSEI DNA: NOT DETECTED
COLOR UR AUTO: ABNORMAL
GLUCOSE UR STRIP-MCNC: NEGATIVE MG/DL
HGB UR QL STRIP: ABNORMAL
KETONES UR STRIP-MCNC: NEGATIVE MG/DL
LEUKOCYTE ESTERASE UR QL STRIP: NEGATIVE
MUCOUS THREADS #/AREA URNS LPF: PRESENT /LPF
NITRATE UR QL: NEGATIVE
PH UR STRIP: 5 [PH] (ref 5–9)
RBC URINE: 1 /HPF
SP GR UR STRIP: 1.01 (ref 1–1.03)
T VAGINALIS DNA VAG QL NAA+PROBE: NOT DETECTED
UROBILINOGEN UR STRIP-MCNC: NORMAL MG/DL
WBC URINE: <1 /HPF

## 2025-07-02 PROCEDURE — 81001 URINALYSIS AUTO W/SCOPE: CPT | Mod: ZL | Performed by: NURSE PRACTITIONER

## 2025-07-02 PROCEDURE — 87086 URINE CULTURE/COLONY COUNT: CPT | Mod: ZL | Performed by: NURSE PRACTITIONER

## 2025-07-02 PROCEDURE — 81515 NFCT DS BV&VAGINITIS DNA ALG: CPT | Mod: ZL

## 2025-07-02 ASSESSMENT — ENCOUNTER SYMPTOMS
DYSURIA: 0
FLANK PAIN: 0
FREQUENCY: 1
CARDIOVASCULAR NEGATIVE: 1
NEUROLOGICAL NEGATIVE: 1
RESPIRATORY NEGATIVE: 1
GASTROINTESTINAL NEGATIVE: 1
EYES NEGATIVE: 1
HEMATOLOGIC/LYMPHATIC NEGATIVE: 1
PSYCHIATRIC NEGATIVE: 1
CONSTITUTIONAL NEGATIVE: 1
MUSCULOSKELETAL NEGATIVE: 1

## 2025-07-02 ASSESSMENT — PAIN SCALES - GENERAL
PAINLEVEL_OUTOF10: MODERATE PAIN (4)
PAINLEVEL_OUTOF10: MODERATE PAIN (4)

## 2025-07-02 NOTE — NURSING NOTE
"Patient presented to  earlier in the day with urinary frequency, vaginal bleeding, and abnormal cramping. Patient was brought to Unit 5 by  staff without any call. Unit 5  called writer regarding patient and symptoms. Writer brought patient back in unit to discuss in private as patient was crying at the time.     Patient reports urinary frequency and states yesterday while at work she felt pressure in her lower abdomen and states \"I felt like something was going to come out of me.\" States she had a biopsy done with OMI Gunn CNP a month ago and has had some bleeding. States there is a poly that was found during the biopsy. States she has had mucus and blood a little and today there was more bleeding. States she has been urinating every 20-30 mins. States she has changed her pad a couple times each every coupe hours. States the pad is usually 1/8 full. States she has been taking advil for the lower abdomen pain but didn't take anything today. Reports 4-5/10 pain.     States she went to  thinking a UTI.  did do a UA with culture. Culture is currently in process. Patient has appt on 8/2 with Dr Morrissey regarding polypectomy consult.     Cecilio Cabrera RN on 7/2/2025 at 12:53 PM      "

## 2025-07-02 NOTE — PROGRESS NOTES
Shonda Schmid  1970    ASSESSMENT/PLAN      Presents to [facility] with pelvic pressure, urinary pressure with urinary frequency.  Patient has been urinating a small amount every 20 minutes.  She has had increase in abdominal pain and pressure with vaginal bleeding intermittently.  Patient was recently seen by OB/GYN for postmenopausal bleeding.  Patient does have increased vaginal dryness.  She was noted to have a thickened endometrium and a 1.5 cm uterine fibroid.  Patient has no history of urologic issues, no history of bladder infections.  Patient does drink caffeine daily, no recent increase.  No recent dehydration, constipation.  Patient recently had testing with vaginal multiplex that was negative.  Patient declined a multiplex testing today.  Patient does have a history of type 2 diabetes, well-controlled.  No noted glucose on urinalysis.  Urinalysis obtained and shows no significant sign of infection, no nitrates, leukocyte esterase or white blood cells.  Given patient's symptoms urine culture was obtained and pending.  Given unclear etiology would recommend patient follow-up with OB/GYN, possible urology.  Patient's vitals are stable and she appears nontoxic.        1. Urinary frequency (Primary)  2. Abdominal cramping  3. Vaginal bleeding    - Urine Macroscopic with reflex to Microscopic  - Urine culture - pending  - Follow up with urology if symptoms do not improve.  -Patient did recently have a biopsy, recent intercourse can increase his symptoms after biopsy.  - May use over-the-counter Tylenol or ibuprofen PRN  - Follow up as needed for new or worsening symptoms        *A shared decision making model was used.   *Patient and/or associated parties understood and were agreeable to treatment plan.   *Plan and all results were discussed.   *Explanation of diagnosis, treatment options and risk and benefits of medications reviewed with patient.    *Time was taken to answer all questions.   *Red  flags symptoms were discussed and patient was advised when they should return for reevaluation or for prompt emergency evaluation.   *Patient was given verbal and written instructions on plan of care. Instructions were printed or are available on [employer] on electronic AVS.   *We discussed potential side effects of any prescribed or recommended therapies, as well as expectations for response to treatments.  *Patient discharged in stable condition    Ana Werner CNP  [facility]    SUBJECTIVE  CHIEF COMPLAINT/ REASON FOR VISIT  Patient presents with:  UTI     HISTORY OF PRESENT ILLNESS  Shonda Schmid is a pleasant 55 year old female presents to [facility] today with pelvic pressure, urinary pressure with urinary frequency.  Patient has been urinating a small amount every 20 minutes.  Has had increase in abdominal pain and pressure with vaginal bleeding intermittently.  Patient was recently seen by OB/GYN for postmenopausal bleeding.  Patient does have increased vaginal dryness.  She was noted to have a thickened endometrium and a 1.5 cm uterine fibroid.  Patient has no history of urologic issues, no history of bladder infections.  Patient does drink caffeine daily, no recent increase.  No recent dehydration, constipation.  Patient recently had testing with vaginal multiplex that was negative.  Patient does have a history of type 2 diabetes, well-controlled.          I have reviewed the nursing notes.  I have reviewed allergies, medication list, problem list, and past medical history.    REVIEW OF SYSTEMS  Review of Systems   Constitutional: Negative.    HENT: Negative.     Eyes: Negative.    Respiratory: Negative.     Cardiovascular: Negative.    Gastrointestinal: Negative.    Genitourinary:  Positive for frequency, menstrual problem, pelvic pain, urgency and vaginal bleeding. Negative for dysuria, flank pain, genital sores and vaginal discharge.   Musculoskeletal: Negative.   "  Neurological: Negative.    Hematological: Negative.    Psychiatric/Behavioral: Negative.     All other systems reviewed and are negative.       VITAL SIGNS  Vitals:    07/02/25 1057   BP: 123/76   BP Location: Right arm   Patient Position: Sitting   Cuff Size: Adult Regular   Pulse: 71   Resp: 18   Temp: 97.8  F (36.6  C)   TempSrc: Tympanic   SpO2: 98%   Weight: 83.5 kg (184 lb)   Height: 1.753 m (5' 9\")      Body mass index is 27.17 kg/m .      OBJECTIVE  PHYSICAL EXAM  Physical Exam  Vitals and nursing note reviewed.   Constitutional:       Appearance: Normal appearance.   HENT:      Head: Normocephalic.      Nose: Nose normal.      Mouth/Throat:      Mouth: Mucous membranes are moist.   Eyes:      Pupils: Pupils are equal, round, and reactive to light.   Cardiovascular:      Rate and Rhythm: Normal rate.      Pulses: Normal pulses.   Pulmonary:      Effort: Pulmonary effort is normal.   Musculoskeletal:         General: Normal range of motion.   Skin:     General: Skin is warm and dry.      Capillary Refill: Capillary refill takes less than 2 seconds.   Neurological:      General: No focal deficit present.      Mental Status: She is alert.            DIAGNOSTICS  Results for orders placed or performed in visit on 07/02/25   Urine Macroscopic with reflex to Microscopic     Status: Abnormal   Result Value Ref Range    Color Urine Light Yellow Colorless, Straw, Light Yellow, Yellow    Appearance Urine Clear Clear    Glucose Urine Negative Negative mg/dL    Bilirubin Urine Negative Negative    Ketones Urine Negative Negative mg/dL    Specific Gravity Urine 1.006 1.000 - 1.030    Blood Urine Trace (A) Negative    pH Urine 5.0 5.0 - 9.0    Protein Albumin Urine Negative Negative mg/dL    Urobilinogen Urine Normal Normal mg/dL    Nitrite Urine Negative Negative    Leukocyte Esterase Urine Negative Negative    Bacteria Urine Few (A) None Seen /HPF    RBC Urine 1 <=2 /HPF    WBC Urine <1 <=5 /HPF    Mucus Urine Present " (A) None Seen /LPF

## 2025-07-02 NOTE — TELEPHONE ENCOUNTER
"OV: 7/2/25  OMI Gunn CNP   \"Discussed with patient that vaginal bleeding is very much likely due to her cervical irritation from intercourse.  We discussed how the urethra can be irritated with intercourse as well and cause urinary frequency like this.\"    Cecilio Cabrera RN on 7/2/2025 at 2:10 PM    "

## 2025-07-02 NOTE — PROGRESS NOTES
"Follow-Up Visit    S: Ms. Shonda Schmid is a 55 year old No obstetric history on file. here for concerns of urinary frequency as well as vaginal bleeding.  She was seen in [facility] today where UA was completed and did not show an infection.  Patient is wondering what this bleeding is caused from.  She noticed that the bleeding was much worse yesterday.  Discussed with patient and she did have intercourse the night prior to the bleeding occurring.  She is currently 2 weeks out from endometrial biopsy.  No new vaginal itching or irritation.  She notes that she does have some pelvic pressure but no otherwise concerning findings.  Her main concern is her urinary frequency..    O:  /82 (BP Location: Right arm, Patient Position: Sitting, Cuff Size: Adult Regular)   Pulse 77   Resp 16   Ht 1.753 m (5' 9\")   Wt 83.3 kg (183 lb 11.2 oz)   LMP 01/01/2019 (Within Months)   SpO2 96%   BMI 27.13 kg/m    Gen: Well-appearing, NAD  Pulm: nonlabored      Pelvic:  Normal appearing external female genitalia. Normal hair distribution. Vagina is without lesions.  Moderate brown discharge. Cervix noted to have multiple sites of erosion all the way around it that are bleeding.  Easily made bleed with swab.  No lesions, no cervical motion tenderness. Uterus is small, mobile, non-tender. No adnexal tenderness or masses.  MVP collected.    A/P:  Ms. Shonda Schmid is a 55 year old No obstetric history on file. here for urinary frequency as well as vaginal bleeding.  Discussed with patient that vaginal bleeding is very much likely due to her cervical irritation from intercourse.  We discussed how the urethra can be irritated with intercourse as well and cause urinary frequency like this.  Recommended pushing fluids as well as cranberry juice.  Collected to rule out infection.  Discussed with patient that she is okay to wait until her August 1 appointment with Dr. Morrissey for consideration of polypectomy versus " hysterectomy.  Patient voices agreement and is reassured with this.  She voices no further concerns..    Cherrie BRAGA, CHAPITOP-C  7/2/2025 12:21 PM

## 2025-07-02 NOTE — PROGRESS NOTES
"Chief Complaint   Patient presents with    UTI   Patient is here to be seen for UTI symptoms.    FOOD SECURITY SCREENING QUESTIONS  Hunger Vital Signs:  Within the past 12 months we worried whether our food would run out before we got money to buy more. Never  Within the past 12 months the food we bought just didn't last and we didn't have money to get more. Never  Yanni Damon 7/2/2025 10:59 AM      Initial /76 (BP Location: Right arm, Patient Position: Sitting, Cuff Size: Adult Regular)   Pulse 71   Temp 97.8  F (36.6  C) (Tympanic)   Resp 18   Ht 1.753 m (5' 9\")   Wt 83.5 kg (184 lb)   LMP 01/01/2019 (Within Months)   SpO2 98%   BMI 27.17 kg/m   Estimated body mass index is 27.17 kg/m  as calculated from the following:    Height as of this encounter: 1.753 m (5' 9\").    Weight as of this encounter: 83.5 kg (184 lb).  Medication Reconciliation: complete    Yanni Damon   "

## 2025-07-03 ENCOUNTER — RESULTS FOLLOW-UP (OUTPATIENT)
Dept: FAMILY MEDICINE | Facility: OTHER | Age: 55
End: 2025-07-03

## 2025-07-03 LAB — BACTERIA UR CULT: NORMAL
